# Patient Record
Sex: FEMALE | Race: WHITE | Employment: FULL TIME | URBAN - METROPOLITAN AREA
[De-identification: names, ages, dates, MRNs, and addresses within clinical notes are randomized per-mention and may not be internally consistent; named-entity substitution may affect disease eponyms.]

---

## 2017-07-27 ENCOUNTER — HOSPITAL ENCOUNTER (INPATIENT)
Age: 27
LOS: 2 days | Discharge: HOME | DRG: 563 | End: 2017-07-29
Attending: EMERGENCY MEDICINE | Admitting: ORTHOPAEDIC SURGERY

## 2017-07-27 ENCOUNTER — APPOINTMENT (OUTPATIENT)
Dept: CT IMAGING | Age: 27
DRG: 563 | End: 2017-07-27

## 2017-07-27 ENCOUNTER — APPOINTMENT (OUTPATIENT)
Dept: GENERAL RADIOLOGY | Age: 27
DRG: 563 | End: 2017-07-27

## 2017-07-27 ENCOUNTER — ANESTHESIA EVENT (OUTPATIENT)
Dept: OPERATING ROOM | Age: 27
End: 2017-07-27

## 2017-07-27 DIAGNOSIS — S82.142A TIBIAL PLATEAU FRACTURE, LEFT, CLOSED, INITIAL ENCOUNTER: Primary | ICD-10-CM

## 2017-07-27 LAB
ABSOLUTE EOS #: 0.2 K/UL (ref 0–0.4)
ABSOLUTE LYMPH #: 2.7 K/UL (ref 1–4.8)
ABSOLUTE MONO #: 0.6 K/UL (ref 0.2–0.8)
ANION GAP SERPL CALCULATED.3IONS-SCNC: 19 MMOL/L (ref 9–17)
BASOPHILS # BLD: 1 %
BASOPHILS ABSOLUTE: 0.1 K/UL (ref 0–0.2)
BUN BLDV-MCNC: 15 MG/DL (ref 6–20)
BUN/CREAT BLD: 24 (ref 9–20)
CALCIUM SERPL-MCNC: 9.3 MG/DL (ref 8.6–10.4)
CHLORIDE BLD-SCNC: 101 MMOL/L (ref 98–107)
CO2: 20 MMOL/L (ref 20–31)
CREAT SERPL-MCNC: 0.63 MG/DL (ref 0.5–0.9)
DIFFERENTIAL TYPE: NORMAL
EOSINOPHILS RELATIVE PERCENT: 2 %
ETHANOL PERCENT: 0.17 %
ETHANOL: 169 MG/DL
GFR AFRICAN AMERICAN: >60 ML/MIN
GFR NON-AFRICAN AMERICAN: >60 ML/MIN
GFR SERPL CREATININE-BSD FRML MDRD: ABNORMAL ML/MIN/{1.73_M2}
GFR SERPL CREATININE-BSD FRML MDRD: ABNORMAL ML/MIN/{1.73_M2}
GLUCOSE BLD-MCNC: 128 MG/DL (ref 70–99)
HCG QUALITATIVE: NEGATIVE
HCT VFR BLD CALC: 39.3 % (ref 36–46)
HEMOGLOBIN: 13.4 G/DL (ref 12–16)
INR BLD: 1
LYMPHOCYTES # BLD: 25 %
MCH RBC QN AUTO: 30.2 PG (ref 26–34)
MCHC RBC AUTO-ENTMCNC: 34.2 G/DL (ref 31–37)
MCV RBC AUTO: 88.3 FL (ref 80–100)
MONOCYTES # BLD: 5 %
PARTIAL THROMBOPLASTIN TIME: 22.6 SEC (ref 23–31)
PDW BLD-RTO: 12.8 % (ref 11.5–14.5)
PLATELET # BLD: 210 K/UL (ref 130–400)
PLATELET ESTIMATE: NORMAL
PMV BLD AUTO: 9.7 FL (ref 6–12)
POTASSIUM SERPL-SCNC: 3.7 MMOL/L (ref 3.7–5.3)
PROTHROMBIN TIME: 10 SEC (ref 9.7–11.6)
RBC # BLD: 4.46 M/UL (ref 4–5.2)
RBC # BLD: NORMAL 10*6/UL
SEG NEUTROPHILS: 67 %
SEGMENTED NEUTROPHILS ABSOLUTE COUNT: 7.3 K/UL (ref 1.8–7.7)
SODIUM BLD-SCNC: 140 MMOL/L (ref 135–144)
WBC # BLD: 10.9 K/UL (ref 3.5–11)
WBC # BLD: NORMAL 10*3/UL

## 2017-07-27 PROCEDURE — 80048 BASIC METABOLIC PNL TOTAL CA: CPT

## 2017-07-27 PROCEDURE — 2580000003 HC RX 258: Performed by: EMERGENCY MEDICINE

## 2017-07-27 PROCEDURE — 6360000002 HC RX W HCPCS: Performed by: ORTHOPAEDIC SURGERY

## 2017-07-27 PROCEDURE — G0480 DRUG TEST DEF 1-7 CLASSES: HCPCS

## 2017-07-27 PROCEDURE — 85025 COMPLETE CBC W/AUTO DIFF WBC: CPT

## 2017-07-27 PROCEDURE — 76376 3D RENDER W/INTRP POSTPROCES: CPT

## 2017-07-27 PROCEDURE — 2500000003 HC RX 250 WO HCPCS: Performed by: ORTHOPAEDIC SURGERY

## 2017-07-27 PROCEDURE — 73700 CT LOWER EXTREMITY W/O DYE: CPT

## 2017-07-27 PROCEDURE — 6360000002 HC RX W HCPCS: Performed by: EMERGENCY MEDICINE

## 2017-07-27 PROCEDURE — 85610 PROTHROMBIN TIME: CPT

## 2017-07-27 PROCEDURE — 85730 THROMBOPLASTIN TIME PARTIAL: CPT

## 2017-07-27 PROCEDURE — 1200000000 HC SEMI PRIVATE

## 2017-07-27 PROCEDURE — 99285 EMERGENCY DEPT VISIT HI MDM: CPT

## 2017-07-27 PROCEDURE — 96374 THER/PROPH/DIAG INJ IV PUSH: CPT

## 2017-07-27 PROCEDURE — 2580000003 HC RX 258: Performed by: ORTHOPAEDIC SURGERY

## 2017-07-27 PROCEDURE — 73562 X-RAY EXAM OF KNEE 3: CPT

## 2017-07-27 PROCEDURE — S0028 INJECTION, FAMOTIDINE, 20 MG: HCPCS | Performed by: ORTHOPAEDIC SURGERY

## 2017-07-27 PROCEDURE — 96375 TX/PRO/DX INJ NEW DRUG ADDON: CPT

## 2017-07-27 PROCEDURE — 84703 CHORIONIC GONADOTROPIN ASSAY: CPT

## 2017-07-27 RX ORDER — SODIUM CHLORIDE 9 MG/ML
INJECTION, SOLUTION INTRAVENOUS CONTINUOUS
Status: DISCONTINUED | OUTPATIENT
Start: 2017-07-27 | End: 2017-07-28

## 2017-07-27 RX ORDER — SODIUM CHLORIDE 0.9 % (FLUSH) 0.9 %
10 SYRINGE (ML) INJECTION EVERY 12 HOURS SCHEDULED
Status: CANCELLED | OUTPATIENT
Start: 2017-07-27

## 2017-07-27 RX ORDER — MORPHINE SULFATE 4 MG/ML
4 INJECTION, SOLUTION INTRAMUSCULAR; INTRAVENOUS
Status: DISCONTINUED | OUTPATIENT
Start: 2017-07-27 | End: 2017-07-29 | Stop reason: HOSPADM

## 2017-07-27 RX ORDER — LORAZEPAM 2 MG/ML
1 INJECTION INTRAMUSCULAR EVERY 4 HOURS PRN
Status: DISCONTINUED | OUTPATIENT
Start: 2017-07-27 | End: 2017-07-29 | Stop reason: HOSPADM

## 2017-07-27 RX ORDER — ONDANSETRON 2 MG/ML
4 INJECTION INTRAMUSCULAR; INTRAVENOUS EVERY 6 HOURS PRN
Status: DISCONTINUED | OUTPATIENT
Start: 2017-07-27 | End: 2017-07-29 | Stop reason: HOSPADM

## 2017-07-27 RX ORDER — MORPHINE SULFATE 2 MG/ML
2 INJECTION, SOLUTION INTRAMUSCULAR; INTRAVENOUS
Status: DISCONTINUED | OUTPATIENT
Start: 2017-07-27 | End: 2017-07-29 | Stop reason: HOSPADM

## 2017-07-27 RX ORDER — DIAZEPAM 2 MG/1
2 TABLET ORAL EVERY 6 HOURS PRN
Qty: 20 TABLET | Refills: 0 | Status: SHIPPED | OUTPATIENT
Start: 2017-07-27 | End: 2017-08-06

## 2017-07-27 RX ORDER — DOCUSATE SODIUM 100 MG/1
100 CAPSULE, LIQUID FILLED ORAL 2 TIMES DAILY
Qty: 30 CAPSULE | Refills: 0 | Status: SHIPPED | OUTPATIENT
Start: 2017-07-27

## 2017-07-27 RX ORDER — SODIUM CHLORIDE 9 MG/ML
INJECTION, SOLUTION INTRAVENOUS CONTINUOUS
Status: DISCONTINUED | OUTPATIENT
Start: 2017-07-27 | End: 2017-07-27 | Stop reason: DRUGHIGH

## 2017-07-27 RX ORDER — LORAZEPAM 2 MG/ML
1 INJECTION INTRAMUSCULAR ONCE
Status: COMPLETED | OUTPATIENT
Start: 2017-07-27 | End: 2017-07-27

## 2017-07-27 RX ORDER — SODIUM CHLORIDE 0.9 % (FLUSH) 0.9 %
10 SYRINGE (ML) INJECTION PRN
Status: CANCELLED | OUTPATIENT
Start: 2017-07-27

## 2017-07-27 RX ORDER — ONDANSETRON 2 MG/ML
4 INJECTION INTRAMUSCULAR; INTRAVENOUS ONCE
Status: COMPLETED | OUTPATIENT
Start: 2017-07-27 | End: 2017-07-27

## 2017-07-27 RX ORDER — SODIUM CHLORIDE 0.9 % (FLUSH) 0.9 %
10 SYRINGE (ML) INJECTION EVERY 12 HOURS SCHEDULED
Status: DISCONTINUED | OUTPATIENT
Start: 2017-07-27 | End: 2017-07-29 | Stop reason: HOSPADM

## 2017-07-27 RX ORDER — SODIUM CHLORIDE 0.9 % (FLUSH) 0.9 %
10 SYRINGE (ML) INJECTION PRN
Status: DISCONTINUED | OUTPATIENT
Start: 2017-07-27 | End: 2017-07-29 | Stop reason: HOSPADM

## 2017-07-27 RX ORDER — ACETAMINOPHEN 500 MG
1000 TABLET ORAL ONCE
Status: CANCELLED | OUTPATIENT
Start: 2017-07-27 | End: 2017-07-27

## 2017-07-27 RX ORDER — OXYCODONE HYDROCHLORIDE AND ACETAMINOPHEN 5; 325 MG/1; MG/1
1-2 TABLET ORAL EVERY 4 HOURS PRN
Qty: 80 TABLET | Refills: 0 | Status: SHIPPED | OUTPATIENT
Start: 2017-07-27 | End: 2017-08-03

## 2017-07-27 RX ORDER — ONDANSETRON 4 MG/1
4 TABLET, FILM COATED ORAL EVERY 6 HOURS PRN
Qty: 30 TABLET | Refills: 1 | Status: SHIPPED | OUTPATIENT
Start: 2017-07-27

## 2017-07-27 RX ADMIN — SODIUM CHLORIDE: 9 INJECTION, SOLUTION INTRAVENOUS at 20:34

## 2017-07-27 RX ADMIN — LORAZEPAM 1 MG: 2 INJECTION INTRAMUSCULAR; INTRAVENOUS at 04:26

## 2017-07-27 RX ADMIN — MORPHINE SULFATE 4 MG: 4 INJECTION, SOLUTION INTRAMUSCULAR; INTRAVENOUS at 17:52

## 2017-07-27 RX ADMIN — SODIUM CHLORIDE: 9 INJECTION, SOLUTION INTRAVENOUS at 04:26

## 2017-07-27 RX ADMIN — MORPHINE SULFATE 4 MG: 4 INJECTION, SOLUTION INTRAMUSCULAR; INTRAVENOUS at 10:51

## 2017-07-27 RX ADMIN — MORPHINE SULFATE 4 MG: 4 INJECTION, SOLUTION INTRAMUSCULAR; INTRAVENOUS at 07:30

## 2017-07-27 RX ADMIN — MORPHINE SULFATE 4 MG: 4 INJECTION, SOLUTION INTRAMUSCULAR; INTRAVENOUS at 14:58

## 2017-07-27 RX ADMIN — LORAZEPAM 1 MG: 2 INJECTION INTRAMUSCULAR; INTRAVENOUS at 12:40

## 2017-07-27 RX ADMIN — FAMOTIDINE 20 MG: 10 INJECTION, SOLUTION INTRAVENOUS at 20:34

## 2017-07-27 RX ADMIN — SODIUM CHLORIDE: 9 INJECTION, SOLUTION INTRAVENOUS at 06:13

## 2017-07-27 RX ADMIN — ONDANSETRON 4 MG: 2 INJECTION INTRAMUSCULAR; INTRAVENOUS at 04:26

## 2017-07-27 RX ADMIN — MORPHINE SULFATE 2 MG: 2 INJECTION, SOLUTION INTRAMUSCULAR; INTRAVENOUS at 22:42

## 2017-07-27 RX ADMIN — FAMOTIDINE 20 MG: 10 INJECTION, SOLUTION INTRAVENOUS at 07:46

## 2017-07-27 RX ADMIN — SODIUM CHLORIDE: 9 INJECTION, SOLUTION INTRAVENOUS at 12:51

## 2017-07-27 ASSESSMENT — PAIN DESCRIPTION - ORIENTATION
ORIENTATION: LEFT

## 2017-07-27 ASSESSMENT — PAIN SCALES - GENERAL
PAINLEVEL_OUTOF10: 4
PAINLEVEL_OUTOF10: 9
PAINLEVEL_OUTOF10: 7
PAINLEVEL_OUTOF10: 7
PAINLEVEL_OUTOF10: 6
PAINLEVEL_OUTOF10: 7
PAINLEVEL_OUTOF10: 0
PAINLEVEL_OUTOF10: 4
PAINLEVEL_OUTOF10: 3
PAINLEVEL_OUTOF10: 4
PAINLEVEL_OUTOF10: 0
PAINLEVEL_OUTOF10: 7

## 2017-07-27 ASSESSMENT — PAIN DESCRIPTION - DESCRIPTORS
DESCRIPTORS: ACHING
DESCRIPTORS: SPASM
DESCRIPTORS: ACHING;SHARP

## 2017-07-27 ASSESSMENT — PAIN DESCRIPTION - PAIN TYPE
TYPE: ACUTE PAIN

## 2017-07-27 ASSESSMENT — PAIN DESCRIPTION - FREQUENCY
FREQUENCY: CONTINUOUS

## 2017-07-27 ASSESSMENT — PAIN DESCRIPTION - ONSET
ONSET: ON-GOING
ONSET: ON-GOING

## 2017-07-27 ASSESSMENT — PAIN DESCRIPTION - LOCATION
LOCATION: KNEE
LOCATION: OTHER (COMMENT)
LOCATION: KNEE
LOCATION: LEG

## 2017-07-28 ENCOUNTER — ANESTHESIA (OUTPATIENT)
Dept: OPERATING ROOM | Age: 27
End: 2017-07-28

## 2017-07-28 PROCEDURE — 1200000000 HC SEMI PRIVATE

## 2017-07-28 PROCEDURE — 97166 OT EVAL MOD COMPLEX 45 MIN: CPT

## 2017-07-28 PROCEDURE — 2580000003 HC RX 258: Performed by: ORTHOPAEDIC SURGERY

## 2017-07-28 PROCEDURE — 6360000002 HC RX W HCPCS: Performed by: ORTHOPAEDIC SURGERY

## 2017-07-28 PROCEDURE — 97116 GAIT TRAINING THERAPY: CPT

## 2017-07-28 PROCEDURE — 6370000000 HC RX 637 (ALT 250 FOR IP): Performed by: ORTHOPAEDIC SURGERY

## 2017-07-28 PROCEDURE — G8987 SELF CARE CURRENT STATUS: HCPCS

## 2017-07-28 PROCEDURE — 97535 SELF CARE MNGMENT TRAINING: CPT

## 2017-07-28 PROCEDURE — 97161 PT EVAL LOW COMPLEX 20 MIN: CPT

## 2017-07-28 PROCEDURE — 97530 THERAPEUTIC ACTIVITIES: CPT

## 2017-07-28 PROCEDURE — G8988 SELF CARE GOAL STATUS: HCPCS

## 2017-07-28 RX ORDER — DOCUSATE SODIUM 100 MG/1
100 CAPSULE, LIQUID FILLED ORAL 2 TIMES DAILY
Status: DISCONTINUED | OUTPATIENT
Start: 2017-07-28 | End: 2017-07-29 | Stop reason: HOSPADM

## 2017-07-28 RX ORDER — OXYCODONE HYDROCHLORIDE AND ACETAMINOPHEN 5; 325 MG/1; MG/1
1 TABLET ORAL EVERY 4 HOURS PRN
Status: DISCONTINUED | OUTPATIENT
Start: 2017-07-28 | End: 2017-07-29 | Stop reason: HOSPADM

## 2017-07-28 RX ORDER — OXYCODONE HYDROCHLORIDE AND ACETAMINOPHEN 5; 325 MG/1; MG/1
2 TABLET ORAL EVERY 4 HOURS PRN
Status: DISCONTINUED | OUTPATIENT
Start: 2017-07-28 | End: 2017-07-29 | Stop reason: HOSPADM

## 2017-07-28 RX ADMIN — MORPHINE SULFATE 2 MG: 2 INJECTION, SOLUTION INTRAMUSCULAR; INTRAVENOUS at 03:24

## 2017-07-28 RX ADMIN — SODIUM CHLORIDE: 9 INJECTION, SOLUTION INTRAVENOUS at 04:41

## 2017-07-28 RX ADMIN — DOCUSATE SODIUM 100 MG: 100 CAPSULE, LIQUID FILLED ORAL at 21:40

## 2017-07-28 RX ADMIN — OXYCODONE HYDROCHLORIDE AND ACETAMINOPHEN 2 TABLET: 5; 325 TABLET ORAL at 21:40

## 2017-07-28 RX ADMIN — MORPHINE SULFATE 2 MG: 2 INJECTION, SOLUTION INTRAMUSCULAR; INTRAVENOUS at 01:10

## 2017-07-28 RX ADMIN — MORPHINE SULFATE 2 MG: 2 INJECTION, SOLUTION INTRAMUSCULAR; INTRAVENOUS at 08:49

## 2017-07-28 RX ADMIN — OXYCODONE HYDROCHLORIDE AND ACETAMINOPHEN 2 TABLET: 5; 325 TABLET ORAL at 13:38

## 2017-07-28 RX ADMIN — Medication 10 ML: at 21:40

## 2017-07-28 RX ADMIN — OXYCODONE HYDROCHLORIDE AND ACETAMINOPHEN 1 TABLET: 5; 325 TABLET ORAL at 10:07

## 2017-07-28 RX ADMIN — OXYCODONE HYDROCHLORIDE AND ACETAMINOPHEN 2 TABLET: 5; 325 TABLET ORAL at 17:38

## 2017-07-28 ASSESSMENT — PAIN DESCRIPTION - PAIN TYPE
TYPE: ACUTE PAIN

## 2017-07-28 ASSESSMENT — PAIN DESCRIPTION - ORIENTATION
ORIENTATION: LEFT

## 2017-07-28 ASSESSMENT — PAIN SCALES - GENERAL
PAINLEVEL_OUTOF10: 7
PAINLEVEL_OUTOF10: 2
PAINLEVEL_OUTOF10: 3
PAINLEVEL_OUTOF10: 5
PAINLEVEL_OUTOF10: 4
PAINLEVEL_OUTOF10: 3
PAINLEVEL_OUTOF10: 7
PAINLEVEL_OUTOF10: 2
PAINLEVEL_OUTOF10: 0
PAINLEVEL_OUTOF10: 3
PAINLEVEL_OUTOF10: 3
PAINLEVEL_OUTOF10: 4
PAINLEVEL_OUTOF10: 5
PAINLEVEL_OUTOF10: 7
PAINLEVEL_OUTOF10: 2

## 2017-07-28 ASSESSMENT — PAIN DESCRIPTION - LOCATION
LOCATION: LEG
LOCATION: KNEE

## 2017-07-28 ASSESSMENT — PAIN DESCRIPTION - DESCRIPTORS
DESCRIPTORS: ACHING

## 2017-07-29 VITALS
DIASTOLIC BLOOD PRESSURE: 80 MMHG | HEIGHT: 66 IN | TEMPERATURE: 97.4 F | HEART RATE: 99 BPM | RESPIRATION RATE: 16 BRPM | BODY MASS INDEX: 35.36 KG/M2 | WEIGHT: 220 LBS | SYSTOLIC BLOOD PRESSURE: 135 MMHG | OXYGEN SATURATION: 95 %

## 2017-07-29 PROCEDURE — 97530 THERAPEUTIC ACTIVITIES: CPT

## 2017-07-29 PROCEDURE — 97535 SELF CARE MNGMENT TRAINING: CPT

## 2017-07-29 PROCEDURE — 6370000000 HC RX 637 (ALT 250 FOR IP): Performed by: ORTHOPAEDIC SURGERY

## 2017-07-29 PROCEDURE — 2580000003 HC RX 258: Performed by: ORTHOPAEDIC SURGERY

## 2017-07-29 PROCEDURE — 97116 GAIT TRAINING THERAPY: CPT

## 2017-07-29 RX ADMIN — OXYCODONE HYDROCHLORIDE AND ACETAMINOPHEN 2 TABLET: 5; 325 TABLET ORAL at 08:37

## 2017-07-29 RX ADMIN — Medication 10 ML: at 08:39

## 2017-07-29 RX ADMIN — DOCUSATE SODIUM 100 MG: 100 CAPSULE, LIQUID FILLED ORAL at 08:38

## 2017-07-29 RX ADMIN — OXYCODONE HYDROCHLORIDE AND ACETAMINOPHEN 2 TABLET: 5; 325 TABLET ORAL at 03:03

## 2017-07-29 ASSESSMENT — PAIN DESCRIPTION - LOCATION
LOCATION: LEG

## 2017-07-29 ASSESSMENT — PAIN SCALES - GENERAL
PAINLEVEL_OUTOF10: 3
PAINLEVEL_OUTOF10: 3
PAINLEVEL_OUTOF10: 1
PAINLEVEL_OUTOF10: 7

## 2017-07-29 ASSESSMENT — PAIN DESCRIPTION - DESCRIPTORS
DESCRIPTORS: ACHING

## 2017-07-29 ASSESSMENT — PAIN DESCRIPTION - ORIENTATION
ORIENTATION: LEFT

## 2017-07-29 ASSESSMENT — PAIN DESCRIPTION - FREQUENCY: FREQUENCY: CONTINUOUS

## 2017-07-29 ASSESSMENT — PAIN DESCRIPTION - PAIN TYPE
TYPE: ACUTE PAIN

## 2017-08-04 ENCOUNTER — HOSPITAL ENCOUNTER (OUTPATIENT)
Age: 27
Setting detail: OBSERVATION
Discharge: HOME | End: 2017-08-06
Attending: ORTHOPAEDIC SURGERY | Admitting: ORTHOPAEDIC SURGERY

## 2017-08-04 ENCOUNTER — APPOINTMENT (OUTPATIENT)
Dept: GENERAL RADIOLOGY | Age: 27
End: 2017-08-04
Attending: ORTHOPAEDIC SURGERY

## 2017-08-04 VITALS — OXYGEN SATURATION: 98 % | SYSTOLIC BLOOD PRESSURE: 147 MMHG | TEMPERATURE: 98.2 F | DIASTOLIC BLOOD PRESSURE: 90 MMHG

## 2017-08-04 LAB — HCG, PREGNANCY URINE (POC): NEGATIVE

## 2017-08-04 PROCEDURE — 2500000003 HC RX 250 WO HCPCS: Performed by: STUDENT IN AN ORGANIZED HEALTH CARE EDUCATION/TRAINING PROGRAM

## 2017-08-04 PROCEDURE — 96374 THER/PROPH/DIAG INJ IV PUSH: CPT

## 2017-08-04 PROCEDURE — 73562 X-RAY EXAM OF KNEE 3: CPT

## 2017-08-04 PROCEDURE — 6360000002 HC RX W HCPCS: Performed by: NURSE ANESTHETIST, CERTIFIED REGISTERED

## 2017-08-04 PROCEDURE — 96376 TX/PRO/DX INJ SAME DRUG ADON: CPT

## 2017-08-04 PROCEDURE — 6360000002 HC RX W HCPCS: Performed by: STUDENT IN AN ORGANIZED HEALTH CARE EDUCATION/TRAINING PROGRAM

## 2017-08-04 PROCEDURE — 73590 X-RAY EXAM OF LOWER LEG: CPT

## 2017-08-04 PROCEDURE — 6360000002 HC RX W HCPCS

## 2017-08-04 PROCEDURE — 2580000003 HC RX 258: Performed by: ANESTHESIOLOGY

## 2017-08-04 PROCEDURE — 2580000003 HC RX 258: Performed by: STUDENT IN AN ORGANIZED HEALTH CARE EDUCATION/TRAINING PROGRAM

## 2017-08-04 PROCEDURE — 84703 CHORIONIC GONADOTROPIN ASSAY: CPT

## 2017-08-04 PROCEDURE — 2720000010 HC SURG SUPPLY STERILE: Performed by: ORTHOPAEDIC SURGERY

## 2017-08-04 PROCEDURE — 3600000014 HC SURGERY LEVEL 4 ADDTL 15MIN: Performed by: ORTHOPAEDIC SURGERY

## 2017-08-04 PROCEDURE — 2500000003 HC RX 250 WO HCPCS: Performed by: NURSE ANESTHETIST, CERTIFIED REGISTERED

## 2017-08-04 PROCEDURE — 2500000003 HC RX 250 WO HCPCS: Performed by: ORTHOPAEDIC SURGERY

## 2017-08-04 PROCEDURE — 3700000000 HC ANESTHESIA ATTENDED CARE: Performed by: ORTHOPAEDIC SURGERY

## 2017-08-04 PROCEDURE — 3700000001 HC ADD 15 MINUTES (ANESTHESIA): Performed by: ORTHOPAEDIC SURGERY

## 2017-08-04 PROCEDURE — 2500000003 HC RX 250 WO HCPCS: Performed by: ANESTHESIOLOGY

## 2017-08-04 PROCEDURE — 6370000000 HC RX 637 (ALT 250 FOR IP): Performed by: STUDENT IN AN ORGANIZED HEALTH CARE EDUCATION/TRAINING PROGRAM

## 2017-08-04 PROCEDURE — 7100000000 HC PACU RECOVERY - FIRST 15 MIN: Performed by: ORTHOPAEDIC SURGERY

## 2017-08-04 PROCEDURE — G0378 HOSPITAL OBSERVATION PER HR: HCPCS

## 2017-08-04 PROCEDURE — 7100000001 HC PACU RECOVERY - ADDTL 15 MIN: Performed by: ORTHOPAEDIC SURGERY

## 2017-08-04 PROCEDURE — A6454 SELF-ADHER BAND W>=3" <5"/YD: HCPCS | Performed by: ORTHOPAEDIC SURGERY

## 2017-08-04 PROCEDURE — 6360000002 HC RX W HCPCS: Performed by: ANESTHESIOLOGY

## 2017-08-04 PROCEDURE — C1769 GUIDE WIRE: HCPCS | Performed by: ORTHOPAEDIC SURGERY

## 2017-08-04 PROCEDURE — C1713 ANCHOR/SCREW BN/BN,TIS/BN: HCPCS | Performed by: ORTHOPAEDIC SURGERY

## 2017-08-04 PROCEDURE — 2580000003 HC RX 258: Performed by: ORTHOPAEDIC SURGERY

## 2017-08-04 PROCEDURE — 3600000004 HC SURGERY LEVEL 4 BASE: Performed by: ORTHOPAEDIC SURGERY

## 2017-08-04 PROCEDURE — 73560 X-RAY EXAM OF KNEE 1 OR 2: CPT

## 2017-08-04 DEVICE — SCREW BNE L28MM DIA3.5MM CORT S STL ST NONCANNULATED LOK: Type: IMPLANTABLE DEVICE | Site: LEG | Status: FUNCTIONAL

## 2017-08-04 DEVICE — SCREW BNE L30MM DIA3.5MM CORT S STL ST NONCANNULATED LOK: Type: IMPLANTABLE DEVICE | Site: LEG | Status: FUNCTIONAL

## 2017-08-04 DEVICE — SCREW BNE L60MM DIA3.5MM PROX TIB S STL ST FULL THRD T15: Type: IMPLANTABLE DEVICE | Site: LEG | Status: FUNCTIONAL

## 2017-08-04 DEVICE — SCREW BNE L46MM DIA4MM S STL CANN SHT 1/3 THRD SM HEX SOCK: Type: IMPLANTABLE DEVICE | Site: LEG | Status: FUNCTIONAL

## 2017-08-04 DEVICE — SCREW BNE L75MM DIA3.5MM PROX TIB S STL ST FULL THRD T15: Type: IMPLANTABLE DEVICE | Site: LEG | Status: FUNCTIONAL

## 2017-08-04 DEVICE — SCREW BNE L70MM DIA3.5MM PROX TIB S STL ST FULL THRD T15: Type: IMPLANTABLE DEVICE | Site: LEG | Status: FUNCTIONAL

## 2017-08-04 DEVICE — PLATE BNE L117MM 6 H NONSTERILE L PROX TIB S STL VAR ANG: Type: IMPLANTABLE DEVICE | Site: LEG | Status: FUNCTIONAL

## 2017-08-04 DEVICE — SCREW BNE L42MM DIA4MM S STL CANN SHT 1/3 THRD SM HEX SOCK: Type: IMPLANTABLE DEVICE | Site: LEG | Status: FUNCTIONAL

## 2017-08-04 RX ORDER — MAGNESIUM HYDROXIDE 1200 MG/15ML
LIQUID ORAL CONTINUOUS PRN
Status: DISCONTINUED | OUTPATIENT
Start: 2017-08-04 | End: 2017-08-04 | Stop reason: HOSPADM

## 2017-08-04 RX ORDER — OXYCODONE HYDROCHLORIDE AND ACETAMINOPHEN 5; 325 MG/1; MG/1
1 TABLET ORAL EVERY 4 HOURS PRN
Status: DISCONTINUED | OUTPATIENT
Start: 2017-08-04 | End: 2017-08-06 | Stop reason: HOSPADM

## 2017-08-04 RX ORDER — MIDAZOLAM HYDROCHLORIDE 1 MG/ML
INJECTION INTRAMUSCULAR; INTRAVENOUS PRN
Status: DISCONTINUED | OUTPATIENT
Start: 2017-08-04 | End: 2017-08-04 | Stop reason: SDUPTHER

## 2017-08-04 RX ORDER — MEPERIDINE HYDROCHLORIDE 50 MG/ML
12.5 INJECTION INTRAMUSCULAR; INTRAVENOUS; SUBCUTANEOUS EVERY 5 MIN PRN
Status: DISCONTINUED | OUTPATIENT
Start: 2017-08-04 | End: 2017-08-04 | Stop reason: HOSPADM

## 2017-08-04 RX ORDER — LABETALOL HYDROCHLORIDE 5 MG/ML
10 INJECTION, SOLUTION INTRAVENOUS
Status: COMPLETED | OUTPATIENT
Start: 2017-08-04 | End: 2017-08-04

## 2017-08-04 RX ORDER — OXYCODONE HYDROCHLORIDE AND ACETAMINOPHEN 5; 325 MG/1; MG/1
2 TABLET ORAL EVERY 4 HOURS PRN
Status: DISCONTINUED | OUTPATIENT
Start: 2017-08-04 | End: 2017-08-06 | Stop reason: HOSPADM

## 2017-08-04 RX ORDER — FENTANYL CITRATE 50 UG/ML
50 INJECTION, SOLUTION INTRAMUSCULAR; INTRAVENOUS EVERY 5 MIN PRN
Status: DISCONTINUED | OUTPATIENT
Start: 2017-08-04 | End: 2017-08-04 | Stop reason: HOSPADM

## 2017-08-04 RX ORDER — BUPIVACAINE HYDROCHLORIDE 5 MG/ML
INJECTION, SOLUTION EPIDURAL; INTRACAUDAL PRN
Status: DISCONTINUED | OUTPATIENT
Start: 2017-08-04 | End: 2017-08-04 | Stop reason: HOSPADM

## 2017-08-04 RX ORDER — SODIUM CHLORIDE 0.9 % (FLUSH) 0.9 %
10 SYRINGE (ML) INJECTION EVERY 12 HOURS SCHEDULED
Status: DISCONTINUED | OUTPATIENT
Start: 2017-08-04 | End: 2017-08-04 | Stop reason: HOSPADM

## 2017-08-04 RX ORDER — LIDOCAINE HYDROCHLORIDE 10 MG/ML
1 INJECTION, SOLUTION EPIDURAL; INFILTRATION; INTRACAUDAL; PERINEURAL
Status: DISCONTINUED | OUTPATIENT
Start: 2017-08-04 | End: 2017-08-04 | Stop reason: HOSPADM

## 2017-08-04 RX ORDER — MORPHINE SULFATE 4 MG/ML
4 INJECTION, SOLUTION INTRAMUSCULAR; INTRAVENOUS
Status: DISCONTINUED | OUTPATIENT
Start: 2017-08-04 | End: 2017-08-06 | Stop reason: HOSPADM

## 2017-08-04 RX ORDER — DEXAMETHASONE SODIUM PHOSPHATE 10 MG/ML
INJECTION, SOLUTION INTRAMUSCULAR; INTRAVENOUS PRN
Status: DISCONTINUED | OUTPATIENT
Start: 2017-08-04 | End: 2017-08-04 | Stop reason: SDUPTHER

## 2017-08-04 RX ORDER — ROCURONIUM BROMIDE 10 MG/ML
INJECTION, SOLUTION INTRAVENOUS PRN
Status: DISCONTINUED | OUTPATIENT
Start: 2017-08-04 | End: 2017-08-04 | Stop reason: SDUPTHER

## 2017-08-04 RX ORDER — FENTANYL CITRATE 50 UG/ML
25 INJECTION, SOLUTION INTRAMUSCULAR; INTRAVENOUS EVERY 5 MIN PRN
Status: DISCONTINUED | OUTPATIENT
Start: 2017-08-04 | End: 2017-08-04 | Stop reason: HOSPADM

## 2017-08-04 RX ORDER — ONDANSETRON 2 MG/ML
INJECTION INTRAMUSCULAR; INTRAVENOUS PRN
Status: DISCONTINUED | OUTPATIENT
Start: 2017-08-04 | End: 2017-08-04 | Stop reason: SDUPTHER

## 2017-08-04 RX ORDER — MIDAZOLAM HYDROCHLORIDE 1 MG/ML
1 INJECTION INTRAMUSCULAR; INTRAVENOUS
Status: COMPLETED | OUTPATIENT
Start: 2017-08-04 | End: 2017-08-04

## 2017-08-04 RX ORDER — ONDANSETRON 2 MG/ML
4 INJECTION INTRAMUSCULAR; INTRAVENOUS EVERY 6 HOURS PRN
Status: DISCONTINUED | OUTPATIENT
Start: 2017-08-04 | End: 2017-08-06 | Stop reason: HOSPADM

## 2017-08-04 RX ORDER — SODIUM CHLORIDE 0.9 % (FLUSH) 0.9 %
10 SYRINGE (ML) INJECTION PRN
Status: DISCONTINUED | OUTPATIENT
Start: 2017-08-04 | End: 2017-08-04 | Stop reason: HOSPADM

## 2017-08-04 RX ORDER — ONDANSETRON 2 MG/ML
4 INJECTION INTRAMUSCULAR; INTRAVENOUS
Status: DISCONTINUED | OUTPATIENT
Start: 2017-08-04 | End: 2017-08-04 | Stop reason: HOSPADM

## 2017-08-04 RX ORDER — LIDOCAINE HYDROCHLORIDE 10 MG/ML
INJECTION, SOLUTION EPIDURAL; INFILTRATION; INTRACAUDAL; PERINEURAL PRN
Status: DISCONTINUED | OUTPATIENT
Start: 2017-08-04 | End: 2017-08-04 | Stop reason: SDUPTHER

## 2017-08-04 RX ORDER — DIAZEPAM 2 MG/1
2 TABLET ORAL EVERY 6 HOURS PRN
Status: DISCONTINUED | OUTPATIENT
Start: 2017-08-04 | End: 2017-08-06 | Stop reason: HOSPADM

## 2017-08-04 RX ORDER — NORGESTIMATE AND ETHINYL ESTRADIOL 0.25-0.035
1 KIT ORAL
COMMUNITY

## 2017-08-04 RX ORDER — SODIUM CHLORIDE, SODIUM LACTATE, POTASSIUM CHLORIDE, CALCIUM CHLORIDE 600; 310; 30; 20 MG/100ML; MG/100ML; MG/100ML; MG/100ML
INJECTION, SOLUTION INTRAVENOUS CONTINUOUS
Status: DISCONTINUED | OUTPATIENT
Start: 2017-08-04 | End: 2017-08-04

## 2017-08-04 RX ORDER — SODIUM CHLORIDE 450 MG/100ML
INJECTION, SOLUTION INTRAVENOUS CONTINUOUS
Status: DISCONTINUED | OUTPATIENT
Start: 2017-08-04 | End: 2017-08-06 | Stop reason: HOSPADM

## 2017-08-04 RX ORDER — MORPHINE SULFATE 2 MG/ML
2 INJECTION, SOLUTION INTRAMUSCULAR; INTRAVENOUS
Status: DISCONTINUED | OUTPATIENT
Start: 2017-08-04 | End: 2017-08-06 | Stop reason: HOSPADM

## 2017-08-04 RX ORDER — SODIUM CHLORIDE 0.9 % (FLUSH) 0.9 %
10 SYRINGE (ML) INJECTION PRN
Status: DISCONTINUED | OUTPATIENT
Start: 2017-08-04 | End: 2017-08-06 | Stop reason: HOSPADM

## 2017-08-04 RX ORDER — OXYCODONE HYDROCHLORIDE AND ACETAMINOPHEN 5; 325 MG/1; MG/1
1 TABLET ORAL EVERY 4 HOURS PRN
Qty: 50 TABLET | Refills: 0 | Status: SHIPPED | OUTPATIENT
Start: 2017-08-04 | End: 2017-08-17 | Stop reason: SDUPTHER

## 2017-08-04 RX ORDER — SODIUM CHLORIDE 0.9 % (FLUSH) 0.9 %
10 SYRINGE (ML) INJECTION EVERY 12 HOURS SCHEDULED
Status: DISCONTINUED | OUTPATIENT
Start: 2017-08-04 | End: 2017-08-06 | Stop reason: HOSPADM

## 2017-08-04 RX ORDER — ACETAMINOPHEN 500 MG
1000 TABLET ORAL ONCE
Status: DISCONTINUED | OUTPATIENT
Start: 2017-08-04 | End: 2017-08-04 | Stop reason: HOSPADM

## 2017-08-04 RX ORDER — OXYCODONE HYDROCHLORIDE AND ACETAMINOPHEN 5; 325 MG/1; MG/1
1 TABLET ORAL EVERY 4 HOURS PRN
Status: ON HOLD | COMMUNITY
End: 2017-08-04

## 2017-08-04 RX ORDER — PROPOFOL 10 MG/ML
INJECTION, EMULSION INTRAVENOUS PRN
Status: DISCONTINUED | OUTPATIENT
Start: 2017-08-04 | End: 2017-08-04 | Stop reason: SDUPTHER

## 2017-08-04 RX ORDER — DOCUSATE SODIUM 100 MG/1
100 CAPSULE, LIQUID FILLED ORAL 2 TIMES DAILY
Status: DISCONTINUED | OUTPATIENT
Start: 2017-08-04 | End: 2017-08-06 | Stop reason: HOSPADM

## 2017-08-04 RX ORDER — MIDAZOLAM HYDROCHLORIDE 1 MG/ML
INJECTION INTRAMUSCULAR; INTRAVENOUS
Status: COMPLETED
Start: 2017-08-04 | End: 2017-08-04

## 2017-08-04 RX ORDER — FENTANYL CITRATE 50 UG/ML
INJECTION, SOLUTION INTRAMUSCULAR; INTRAVENOUS PRN
Status: DISCONTINUED | OUTPATIENT
Start: 2017-08-04 | End: 2017-08-04 | Stop reason: SDUPTHER

## 2017-08-04 RX ORDER — ACETAMINOPHEN 325 MG/1
650 TABLET ORAL EVERY 4 HOURS PRN
Status: DISCONTINUED | OUTPATIENT
Start: 2017-08-04 | End: 2017-08-06 | Stop reason: HOSPADM

## 2017-08-04 RX ADMIN — DOCUSATE SODIUM 100 MG: 100 CAPSULE ORAL at 21:56

## 2017-08-04 RX ADMIN — DEXTROSE MONOHYDRATE 900 MG: 50 INJECTION, SOLUTION INTRAVENOUS at 17:39

## 2017-08-04 RX ADMIN — OXYCODONE HYDROCHLORIDE AND ACETAMINOPHEN 2 TABLET: 5; 325 TABLET ORAL at 22:58

## 2017-08-04 RX ADMIN — PROPOFOL 160 MG: 10 INJECTION, EMULSION INTRAVENOUS at 09:37

## 2017-08-04 RX ADMIN — HYDROMORPHONE HYDROCHLORIDE 1 MG: 1 INJECTION, SOLUTION INTRAMUSCULAR; INTRAVENOUS; SUBCUTANEOUS at 12:41

## 2017-08-04 RX ADMIN — MIDAZOLAM HYDROCHLORIDE 2 MG: 1 INJECTION, SOLUTION INTRAMUSCULAR; INTRAVENOUS at 09:32

## 2017-08-04 RX ADMIN — FENTANYL CITRATE 50 MCG: 50 INJECTION, SOLUTION INTRAMUSCULAR; INTRAVENOUS at 11:25

## 2017-08-04 RX ADMIN — FENTANYL CITRATE 200 MCG: 50 INJECTION, SOLUTION INTRAMUSCULAR; INTRAVENOUS at 09:52

## 2017-08-04 RX ADMIN — FENTANYL CITRATE 50 MCG: 50 INJECTION, SOLUTION INTRAMUSCULAR; INTRAVENOUS at 13:30

## 2017-08-04 RX ADMIN — SODIUM CHLORIDE, POTASSIUM CHLORIDE, SODIUM LACTATE AND CALCIUM CHLORIDE: 600; 310; 30; 20 INJECTION, SOLUTION INTRAVENOUS at 09:10

## 2017-08-04 RX ADMIN — ONDANSETRON 4 MG: 2 INJECTION, SOLUTION INTRAMUSCULAR; INTRAVENOUS at 09:56

## 2017-08-04 RX ADMIN — DEXAMETHASONE SODIUM PHOSPHATE 10 MG: 10 INJECTION, SOLUTION INTRAMUSCULAR; INTRAVENOUS at 09:56

## 2017-08-04 RX ADMIN — MORPHINE SULFATE 4 MG: 4 INJECTION, SOLUTION INTRAMUSCULAR; INTRAVENOUS at 20:24

## 2017-08-04 RX ADMIN — ROCURONIUM BROMIDE 50 MG: 10 INJECTION INTRAVENOUS at 09:37

## 2017-08-04 RX ADMIN — OXYCODONE HYDROCHLORIDE AND ACETAMINOPHEN 2 TABLET: 5; 325 TABLET ORAL at 15:20

## 2017-08-04 RX ADMIN — FENTANYL CITRATE 50 MCG: 50 INJECTION, SOLUTION INTRAMUSCULAR; INTRAVENOUS at 13:15

## 2017-08-04 RX ADMIN — SODIUM CHLORIDE, POTASSIUM CHLORIDE, SODIUM LACTATE AND CALCIUM CHLORIDE: 600; 310; 30; 20 INJECTION, SOLUTION INTRAVENOUS at 11:19

## 2017-08-04 RX ADMIN — Medication 900 MG: at 09:46

## 2017-08-04 RX ADMIN — FENTANYL CITRATE 150 MCG: 50 INJECTION, SOLUTION INTRAMUSCULAR; INTRAVENOUS at 09:46

## 2017-08-04 RX ADMIN — HYDROMORPHONE HYDROCHLORIDE 1 MG: 1 INJECTION, SOLUTION INTRAMUSCULAR; INTRAVENOUS; SUBCUTANEOUS at 12:12

## 2017-08-04 RX ADMIN — MIDAZOLAM HYDROCHLORIDE 1 MG: 1 INJECTION INTRAMUSCULAR; INTRAVENOUS at 13:45

## 2017-08-04 RX ADMIN — OXYCODONE HYDROCHLORIDE AND ACETAMINOPHEN 2 TABLET: 5; 325 TABLET ORAL at 19:23

## 2017-08-04 RX ADMIN — SODIUM CHLORIDE: 4.5 INJECTION, SOLUTION INTRAVENOUS at 17:39

## 2017-08-04 RX ADMIN — MIDAZOLAM HYDROCHLORIDE 1 MG: 1 INJECTION, SOLUTION INTRAMUSCULAR; INTRAVENOUS at 13:45

## 2017-08-04 RX ADMIN — LIDOCAINE HYDROCHLORIDE 50 MG: 10 INJECTION, SOLUTION EPIDURAL; INFILTRATION; INTRACAUDAL; PERINEURAL at 09:37

## 2017-08-04 RX ADMIN — LABETALOL HYDROCHLORIDE 10 MG: 5 INJECTION, SOLUTION INTRAVENOUS at 13:45

## 2017-08-04 RX ADMIN — FENTANYL CITRATE 50 MCG: 50 INJECTION, SOLUTION INTRAMUSCULAR; INTRAVENOUS at 12:03

## 2017-08-04 RX ADMIN — FENTANYL CITRATE 50 MCG: 50 INJECTION, SOLUTION INTRAMUSCULAR; INTRAVENOUS at 09:37

## 2017-08-04 RX ADMIN — FENTANYL CITRATE 50 MCG: 50 INJECTION, SOLUTION INTRAMUSCULAR; INTRAVENOUS at 14:39

## 2017-08-04 RX ADMIN — MORPHINE SULFATE 4 MG: 4 INJECTION, SOLUTION INTRAMUSCULAR; INTRAVENOUS at 17:39

## 2017-08-04 ASSESSMENT — PAIN SCALES - GENERAL
PAINLEVEL_OUTOF10: 0
PAINLEVEL_OUTOF10: 7
PAINLEVEL_OUTOF10: 7
PAINLEVEL_OUTOF10: 9
PAINLEVEL_OUTOF10: 6
PAINLEVEL_OUTOF10: 7
PAINLEVEL_OUTOF10: 3
PAINLEVEL_OUTOF10: 5
PAINLEVEL_OUTOF10: 3
PAINLEVEL_OUTOF10: 7
PAINLEVEL_OUTOF10: 4
PAINLEVEL_OUTOF10: 7
PAINLEVEL_OUTOF10: 7
PAINLEVEL_OUTOF10: 0
PAINLEVEL_OUTOF10: 10
PAINLEVEL_OUTOF10: 6

## 2017-08-04 ASSESSMENT — PAIN DESCRIPTION - PAIN TYPE
TYPE: SURGICAL PAIN
TYPE: ACUTE PAIN;SURGICAL PAIN
TYPE: SURGICAL PAIN
TYPE: ACUTE PAIN;SURGICAL PAIN

## 2017-08-04 ASSESSMENT — PAIN DESCRIPTION - ORIENTATION
ORIENTATION: LEFT
ORIENTATION: LEFT

## 2017-08-04 ASSESSMENT — PAIN DESCRIPTION - FREQUENCY
FREQUENCY: CONTINUOUS
FREQUENCY: CONTINUOUS

## 2017-08-04 ASSESSMENT — PAIN DESCRIPTION - ONSET
ONSET: ON-GOING
ONSET: ON-GOING

## 2017-08-04 ASSESSMENT — PAIN DESCRIPTION - LOCATION
LOCATION: LEG
LOCATION: LEG

## 2017-08-04 ASSESSMENT — PAIN DESCRIPTION - PROGRESSION
CLINICAL_PROGRESSION: NOT CHANGED
CLINICAL_PROGRESSION: NOT CHANGED

## 2017-08-04 ASSESSMENT — PAIN DESCRIPTION - DESCRIPTORS
DESCRIPTORS: ACHING
DESCRIPTORS: CONSTANT;SORE;DISCOMFORT

## 2017-08-05 PROBLEM — S82.142A CLOSED FRACTURE OF LEFT TIBIAL PLATEAU: Status: ACTIVE | Noted: 2017-08-05

## 2017-08-05 LAB
ANION GAP SERPL CALCULATED.3IONS-SCNC: 13 MMOL/L (ref 9–17)
BUN BLDV-MCNC: 10 MG/DL (ref 6–20)
BUN/CREAT BLD: ABNORMAL (ref 9–20)
CALCIUM SERPL-MCNC: 8.9 MG/DL (ref 8.6–10.4)
CHLORIDE BLD-SCNC: 100 MMOL/L (ref 98–107)
CO2: 23 MMOL/L (ref 20–31)
CREAT SERPL-MCNC: 0.42 MG/DL (ref 0.5–0.9)
GFR AFRICAN AMERICAN: >60 ML/MIN
GFR NON-AFRICAN AMERICAN: >60 ML/MIN
GFR SERPL CREATININE-BSD FRML MDRD: ABNORMAL ML/MIN/{1.73_M2}
GFR SERPL CREATININE-BSD FRML MDRD: ABNORMAL ML/MIN/{1.73_M2}
GLUCOSE BLD-MCNC: 110 MG/DL (ref 70–99)
HCT VFR BLD CALC: 32.9 % (ref 36–46)
HEMOGLOBIN: 11.2 G/DL (ref 12–16)
MCH RBC QN AUTO: 29.9 PG (ref 26–34)
MCHC RBC AUTO-ENTMCNC: 34 G/DL (ref 31–37)
MCV RBC AUTO: 88 FL (ref 80–100)
PDW BLD-RTO: 13.2 % (ref 12.5–15.4)
PLATELET # BLD: 278 K/UL (ref 140–450)
PMV BLD AUTO: 9.6 FL (ref 6–12)
POTASSIUM SERPL-SCNC: 3.8 MMOL/L (ref 3.7–5.3)
RBC # BLD: 3.73 M/UL (ref 4–5.2)
SODIUM BLD-SCNC: 136 MMOL/L (ref 135–144)
WBC # BLD: 8.9 K/UL (ref 3.5–11)

## 2017-08-05 PROCEDURE — 85027 COMPLETE CBC AUTOMATED: CPT

## 2017-08-05 PROCEDURE — 6360000002 HC RX W HCPCS: Performed by: STUDENT IN AN ORGANIZED HEALTH CARE EDUCATION/TRAINING PROGRAM

## 2017-08-05 PROCEDURE — 2500000003 HC RX 250 WO HCPCS: Performed by: STUDENT IN AN ORGANIZED HEALTH CARE EDUCATION/TRAINING PROGRAM

## 2017-08-05 PROCEDURE — 6370000000 HC RX 637 (ALT 250 FOR IP): Performed by: STUDENT IN AN ORGANIZED HEALTH CARE EDUCATION/TRAINING PROGRAM

## 2017-08-05 PROCEDURE — 97110 THERAPEUTIC EXERCISES: CPT

## 2017-08-05 PROCEDURE — 2580000003 HC RX 258: Performed by: STUDENT IN AN ORGANIZED HEALTH CARE EDUCATION/TRAINING PROGRAM

## 2017-08-05 PROCEDURE — 97530 THERAPEUTIC ACTIVITIES: CPT

## 2017-08-05 PROCEDURE — 97535 SELF CARE MNGMENT TRAINING: CPT

## 2017-08-05 PROCEDURE — G8988 SELF CARE GOAL STATUS: HCPCS

## 2017-08-05 PROCEDURE — G8979 MOBILITY GOAL STATUS: HCPCS

## 2017-08-05 PROCEDURE — G8978 MOBILITY CURRENT STATUS: HCPCS

## 2017-08-05 PROCEDURE — 80048 BASIC METABOLIC PNL TOTAL CA: CPT

## 2017-08-05 PROCEDURE — 97162 PT EVAL MOD COMPLEX 30 MIN: CPT

## 2017-08-05 PROCEDURE — G8987 SELF CARE CURRENT STATUS: HCPCS

## 2017-08-05 PROCEDURE — 36415 COLL VENOUS BLD VENIPUNCTURE: CPT

## 2017-08-05 PROCEDURE — G0378 HOSPITAL OBSERVATION PER HR: HCPCS

## 2017-08-05 PROCEDURE — 97166 OT EVAL MOD COMPLEX 45 MIN: CPT

## 2017-08-05 RX ORDER — KETOROLAC TROMETHAMINE 15 MG/ML
15 INJECTION, SOLUTION INTRAMUSCULAR; INTRAVENOUS ONCE
Status: COMPLETED | OUTPATIENT
Start: 2017-08-05 | End: 2017-08-05

## 2017-08-05 RX ORDER — KETOROLAC TROMETHAMINE 30 MG/ML
30 INJECTION, SOLUTION INTRAMUSCULAR; INTRAVENOUS EVERY 8 HOURS PRN
Status: DISCONTINUED | OUTPATIENT
Start: 2017-08-05 | End: 2017-08-06 | Stop reason: HOSPADM

## 2017-08-05 RX ADMIN — OXYCODONE HYDROCHLORIDE AND ACETAMINOPHEN 2 TABLET: 5; 325 TABLET ORAL at 19:54

## 2017-08-05 RX ADMIN — DIAZEPAM 2 MG: 2 TABLET ORAL at 11:51

## 2017-08-05 RX ADMIN — MORPHINE SULFATE 4 MG: 4 INJECTION, SOLUTION INTRAMUSCULAR; INTRAVENOUS at 11:01

## 2017-08-05 RX ADMIN — DOCUSATE SODIUM 100 MG: 100 CAPSULE ORAL at 20:53

## 2017-08-05 RX ADMIN — OXYCODONE HYDROCHLORIDE AND ACETAMINOPHEN 2 TABLET: 5; 325 TABLET ORAL at 15:41

## 2017-08-05 RX ADMIN — MORPHINE SULFATE 4 MG: 4 INJECTION, SOLUTION INTRAMUSCULAR; INTRAVENOUS at 04:42

## 2017-08-05 RX ADMIN — OXYCODONE HYDROCHLORIDE AND ACETAMINOPHEN 2 TABLET: 5; 325 TABLET ORAL at 03:33

## 2017-08-05 RX ADMIN — KETOROLAC TROMETHAMINE 30 MG: 30 INJECTION, SOLUTION INTRAMUSCULAR at 19:56

## 2017-08-05 RX ADMIN — MORPHINE SULFATE 4 MG: 4 INJECTION, SOLUTION INTRAMUSCULAR; INTRAVENOUS at 08:12

## 2017-08-05 RX ADMIN — KETOROLAC TROMETHAMINE 15 MG: 15 INJECTION, SOLUTION INTRAMUSCULAR; INTRAVENOUS at 12:10

## 2017-08-05 RX ADMIN — Medication 10 ML: at 20:54

## 2017-08-05 RX ADMIN — DEXTROSE MONOHYDRATE 900 MG: 50 INJECTION, SOLUTION INTRAVENOUS at 03:32

## 2017-08-05 RX ADMIN — ASPIRIN 325 MG: 325 TABLET, COATED ORAL at 09:05

## 2017-08-05 RX ADMIN — OXYCODONE HYDROCHLORIDE AND ACETAMINOPHEN 2 TABLET: 5; 325 TABLET ORAL at 07:34

## 2017-08-05 RX ADMIN — OXYCODONE HYDROCHLORIDE AND ACETAMINOPHEN 2 TABLET: 5; 325 TABLET ORAL at 11:27

## 2017-08-05 RX ADMIN — DOCUSATE SODIUM 100 MG: 100 CAPSULE ORAL at 09:05

## 2017-08-05 ASSESSMENT — PAIN SCALES - GENERAL
PAINLEVEL_OUTOF10: 10
PAINLEVEL_OUTOF10: 8
PAINLEVEL_OUTOF10: 5
PAINLEVEL_OUTOF10: 8
PAINLEVEL_OUTOF10: 7
PAINLEVEL_OUTOF10: 4
PAINLEVEL_OUTOF10: 3
PAINLEVEL_OUTOF10: 7
PAINLEVEL_OUTOF10: 7
PAINLEVEL_OUTOF10: 6
PAINLEVEL_OUTOF10: 10
PAINLEVEL_OUTOF10: 7
PAINLEVEL_OUTOF10: 10
PAINLEVEL_OUTOF10: 7
PAINLEVEL_OUTOF10: 10
PAINLEVEL_OUTOF10: 9
PAINLEVEL_OUTOF10: 8
PAINLEVEL_OUTOF10: 7
PAINLEVEL_OUTOF10: 3
PAINLEVEL_OUTOF10: 7
PAINLEVEL_OUTOF10: 4
PAINLEVEL_OUTOF10: 10

## 2017-08-05 ASSESSMENT — PAIN DESCRIPTION - PROGRESSION
CLINICAL_PROGRESSION: NOT CHANGED

## 2017-08-05 ASSESSMENT — PAIN DESCRIPTION - FREQUENCY
FREQUENCY: CONTINUOUS
FREQUENCY: CONTINUOUS

## 2017-08-05 ASSESSMENT — PAIN DESCRIPTION - ORIENTATION
ORIENTATION: LEFT

## 2017-08-05 ASSESSMENT — PAIN DESCRIPTION - LOCATION
LOCATION: KNEE
LOCATION: LEG;KNEE

## 2017-08-05 ASSESSMENT — PAIN DESCRIPTION - PAIN TYPE
TYPE: ACUTE PAIN;SURGICAL PAIN
TYPE: ACUTE PAIN;SURGICAL PAIN
TYPE: SURGICAL PAIN;ACUTE PAIN

## 2017-08-05 ASSESSMENT — PAIN DESCRIPTION - DESCRIPTORS
DESCRIPTORS: ACHING;CONSTANT;THROBBING
DESCRIPTORS: CONSTANT;SORE;SPASM
DESCRIPTORS: CONSTANT;SPASM
DESCRIPTORS: BURNING;CONSTANT

## 2017-08-05 ASSESSMENT — PAIN DESCRIPTION - ONSET
ONSET: ON-GOING
ONSET: ON-GOING

## 2017-08-06 VITALS
TEMPERATURE: 98.9 F | HEART RATE: 94 BPM | DIASTOLIC BLOOD PRESSURE: 76 MMHG | OXYGEN SATURATION: 100 % | BODY MASS INDEX: 35.36 KG/M2 | WEIGHT: 220 LBS | SYSTOLIC BLOOD PRESSURE: 115 MMHG | RESPIRATION RATE: 15 BRPM | HEIGHT: 66 IN

## 2017-08-06 PROCEDURE — 96376 TX/PRO/DX INJ SAME DRUG ADON: CPT

## 2017-08-06 PROCEDURE — 6370000000 HC RX 637 (ALT 250 FOR IP): Performed by: STUDENT IN AN ORGANIZED HEALTH CARE EDUCATION/TRAINING PROGRAM

## 2017-08-06 PROCEDURE — 2580000003 HC RX 258: Performed by: STUDENT IN AN ORGANIZED HEALTH CARE EDUCATION/TRAINING PROGRAM

## 2017-08-06 PROCEDURE — 6360000002 HC RX W HCPCS: Performed by: STUDENT IN AN ORGANIZED HEALTH CARE EDUCATION/TRAINING PROGRAM

## 2017-08-06 PROCEDURE — G0378 HOSPITAL OBSERVATION PER HR: HCPCS

## 2017-08-06 PROCEDURE — 97530 THERAPEUTIC ACTIVITIES: CPT

## 2017-08-06 PROCEDURE — 97535 SELF CARE MNGMENT TRAINING: CPT

## 2017-08-06 PROCEDURE — 97110 THERAPEUTIC EXERCISES: CPT

## 2017-08-06 RX ADMIN — Medication 10 ML: at 08:08

## 2017-08-06 RX ADMIN — OXYCODONE HYDROCHLORIDE AND ACETAMINOPHEN 2 TABLET: 5; 325 TABLET ORAL at 12:03

## 2017-08-06 RX ADMIN — OXYCODONE HYDROCHLORIDE AND ACETAMINOPHEN 2 TABLET: 5; 325 TABLET ORAL at 08:07

## 2017-08-06 RX ADMIN — KETOROLAC TROMETHAMINE 30 MG: 30 INJECTION, SOLUTION INTRAMUSCULAR at 12:04

## 2017-08-06 RX ADMIN — DOCUSATE SODIUM 100 MG: 100 CAPSULE ORAL at 08:06

## 2017-08-06 RX ADMIN — OXYCODONE HYDROCHLORIDE AND ACETAMINOPHEN 2 TABLET: 5; 325 TABLET ORAL at 00:02

## 2017-08-06 RX ADMIN — ASPIRIN 325 MG: 325 TABLET, COATED ORAL at 08:06

## 2017-08-06 RX ADMIN — OXYCODONE HYDROCHLORIDE AND ACETAMINOPHEN 2 TABLET: 5; 325 TABLET ORAL at 04:00

## 2017-08-06 RX ADMIN — KETOROLAC TROMETHAMINE 30 MG: 30 INJECTION, SOLUTION INTRAMUSCULAR at 04:00

## 2017-08-06 ASSESSMENT — PAIN DESCRIPTION - ORIENTATION
ORIENTATION: LEFT

## 2017-08-06 ASSESSMENT — PAIN DESCRIPTION - PROGRESSION
CLINICAL_PROGRESSION: NOT CHANGED
CLINICAL_PROGRESSION: GRADUALLY IMPROVING
CLINICAL_PROGRESSION: NOT CHANGED

## 2017-08-06 ASSESSMENT — PAIN SCALES - GENERAL
PAINLEVEL_OUTOF10: 7
PAINLEVEL_OUTOF10: 8
PAINLEVEL_OUTOF10: 8
PAINLEVEL_OUTOF10: 7
PAINLEVEL_OUTOF10: 7
PAINLEVEL_OUTOF10: 6
PAINLEVEL_OUTOF10: 5
PAINLEVEL_OUTOF10: 7
PAINLEVEL_OUTOF10: 5
PAINLEVEL_OUTOF10: 4
PAINLEVEL_OUTOF10: 5

## 2017-08-06 ASSESSMENT — PAIN DESCRIPTION - DESCRIPTORS
DESCRIPTORS: CONSTANT
DESCRIPTORS: CONSTANT;SORE

## 2017-08-06 ASSESSMENT — PAIN DESCRIPTION - FREQUENCY
FREQUENCY: CONTINUOUS
FREQUENCY: CONTINUOUS

## 2017-08-06 ASSESSMENT — PAIN DESCRIPTION - LOCATION
LOCATION: KNEE;LEG
LOCATION: LEG;KNEE
LOCATION: KNEE;LEG

## 2017-08-06 ASSESSMENT — PAIN DESCRIPTION - ONSET: ONSET: ON-GOING

## 2017-08-06 ASSESSMENT — PAIN DESCRIPTION - PAIN TYPE
TYPE: ACUTE PAIN;SURGICAL PAIN
TYPE: SURGICAL PAIN
TYPE: ACUTE PAIN;SURGICAL PAIN

## 2017-08-17 ENCOUNTER — OFFICE VISIT (OUTPATIENT)
Dept: ORTHOPEDIC SURGERY | Age: 27
End: 2017-08-17

## 2017-08-17 VITALS — HEIGHT: 66 IN | BODY MASS INDEX: 35.36 KG/M2 | WEIGHT: 220.02 LBS

## 2017-08-17 DIAGNOSIS — S82.142D CLOSED FRACTURE OF LEFT TIBIAL PLATEAU, WITH ROUTINE HEALING, SUBSEQUENT ENCOUNTER: Primary | ICD-10-CM

## 2017-08-17 PROCEDURE — 99024 POSTOP FOLLOW-UP VISIT: CPT | Performed by: ORTHOPAEDIC SURGERY

## 2017-08-17 RX ORDER — OXYCODONE HYDROCHLORIDE AND ACETAMINOPHEN 5; 325 MG/1; MG/1
1 TABLET ORAL EVERY 4 HOURS PRN
Qty: 40 TABLET | Refills: 0 | Status: SHIPPED | OUTPATIENT
Start: 2017-08-17

## 2017-08-22 ENCOUNTER — HOSPITAL ENCOUNTER (OUTPATIENT)
Dept: PHYSICAL THERAPY | Facility: CLINIC | Age: 27
Setting detail: THERAPIES SERIES
Discharge: HOME | End: 2017-08-22

## 2017-08-22 PROCEDURE — 97110 THERAPEUTIC EXERCISES: CPT

## 2017-08-22 PROCEDURE — 97162 PT EVAL MOD COMPLEX 30 MIN: CPT

## 2017-08-24 ENCOUNTER — HOSPITAL ENCOUNTER (OUTPATIENT)
Dept: PHYSICAL THERAPY | Facility: CLINIC | Age: 27
Setting detail: THERAPIES SERIES
Discharge: HOME | End: 2017-08-24

## 2017-08-24 PROCEDURE — 97110 THERAPEUTIC EXERCISES: CPT

## 2017-08-29 ENCOUNTER — HOSPITAL ENCOUNTER (OUTPATIENT)
Dept: PHYSICAL THERAPY | Facility: CLINIC | Age: 27
Setting detail: THERAPIES SERIES
Discharge: HOME | End: 2017-08-29

## 2017-08-29 PROCEDURE — 97110 THERAPEUTIC EXERCISES: CPT

## 2017-08-30 ENCOUNTER — HOSPITAL ENCOUNTER (OUTPATIENT)
Age: 27
Discharge: HOME | End: 2017-08-30

## 2017-08-30 ENCOUNTER — HOSPITAL ENCOUNTER (OUTPATIENT)
Dept: GENERAL RADIOLOGY | Age: 27
Discharge: HOME | End: 2017-08-30

## 2017-08-30 DIAGNOSIS — S82.142D CLOSED FRACTURE OF LEFT TIBIAL PLATEAU, WITH ROUTINE HEALING, SUBSEQUENT ENCOUNTER: ICD-10-CM

## 2017-08-30 PROCEDURE — 73562 X-RAY EXAM OF KNEE 3: CPT

## 2017-08-30 NOTE — FLOWSHEET NOTE
[] [facility]        Outpatient Physical                Therapy       [address]       Phone: [phone]       Fax: [phone] [] [facility] for Health       Promotion at [address] [facility]       Phone: [phone]       Fax: [phone] [] Adriana Mckeon      for Health Promotion     [address] [facility]      Phone: [phone]      Fax:  [phone]     Physical Therapy Cancel/No Show note    Date: 2017  Patient: Raz Ovalle  : 1990  MRN: [id number]    Cancels/No Shows to date: 1cx/0ns    For 2017 appointment patient:  [x]  Cancelled  []  Rescheduled appointment  []  No-show     Reason given by patient:  []  Patient ill  [x]  Conflicting appointment  []  No transportation    []  Conflict with work  []  No reason given  []  Weather related  []  Other:     Comments:  Next appointments 17 @ 945am & 17 @ 945am confirmed.   Electronically signed by: Jhon Will

## 2017-08-31 ENCOUNTER — HOSPITAL ENCOUNTER (OUTPATIENT)
Dept: PHYSICAL THERAPY | Facility: CLINIC | Age: 27
Setting detail: THERAPIES SERIES
Discharge: HOME | End: 2017-08-31

## 2017-08-31 ENCOUNTER — OFFICE VISIT (OUTPATIENT)
Dept: ORTHOPEDIC SURGERY | Age: 27
End: 2017-08-31

## 2017-08-31 VITALS — HEIGHT: 66 IN | WEIGHT: 220.02 LBS | BODY MASS INDEX: 35.36 KG/M2

## 2017-08-31 DIAGNOSIS — S82.142D CLOSED FRACTURE OF LEFT TIBIAL PLATEAU, WITH ROUTINE HEALING, SUBSEQUENT ENCOUNTER: Primary | ICD-10-CM

## 2017-08-31 PROCEDURE — 99024 POSTOP FOLLOW-UP VISIT: CPT | Performed by: ORTHOPAEDIC SURGERY

## 2017-09-05 ENCOUNTER — HOSPITAL ENCOUNTER (OUTPATIENT)
Dept: PHYSICAL THERAPY | Facility: CLINIC | Age: 27
Setting detail: THERAPIES SERIES
Discharge: HOME | End: 2017-09-05

## 2017-09-05 PROCEDURE — 97110 THERAPEUTIC EXERCISES: CPT

## 2017-09-07 ENCOUNTER — HOSPITAL ENCOUNTER (OUTPATIENT)
Dept: PHYSICAL THERAPY | Facility: CLINIC | Age: 27
Setting detail: THERAPIES SERIES
Discharge: HOME | End: 2017-09-07

## 2017-09-07 PROCEDURE — 97110 THERAPEUTIC EXERCISES: CPT

## 2017-09-12 ENCOUNTER — HOSPITAL ENCOUNTER (OUTPATIENT)
Dept: PHYSICAL THERAPY | Facility: CLINIC | Age: 27
Setting detail: THERAPIES SERIES
Discharge: HOME | End: 2017-09-12

## 2017-09-12 PROCEDURE — 97110 THERAPEUTIC EXERCISES: CPT

## 2017-09-14 ENCOUNTER — HOSPITAL ENCOUNTER (OUTPATIENT)
Dept: PHYSICAL THERAPY | Facility: CLINIC | Age: 27
Setting detail: THERAPIES SERIES
Discharge: HOME | End: 2017-09-14

## 2017-09-14 PROCEDURE — 97110 THERAPEUTIC EXERCISES: CPT

## 2017-09-19 ENCOUNTER — HOSPITAL ENCOUNTER (OUTPATIENT)
Dept: PHYSICAL THERAPY | Facility: CLINIC | Age: 27
Setting detail: THERAPIES SERIES
Discharge: HOME | End: 2017-09-19

## 2017-09-19 PROCEDURE — 97110 THERAPEUTIC EXERCISES: CPT

## 2017-09-21 ENCOUNTER — HOSPITAL ENCOUNTER (OUTPATIENT)
Dept: PHYSICAL THERAPY | Facility: CLINIC | Age: 27
Setting detail: THERAPIES SERIES
Discharge: HOME | End: 2017-09-21

## 2017-09-21 PROCEDURE — 97110 THERAPEUTIC EXERCISES: CPT

## 2017-09-26 ENCOUNTER — HOSPITAL ENCOUNTER (OUTPATIENT)
Dept: PHYSICAL THERAPY | Facility: CLINIC | Age: 27
Setting detail: THERAPIES SERIES
Discharge: HOME | End: 2017-09-26

## 2017-09-26 PROCEDURE — 97110 THERAPEUTIC EXERCISES: CPT

## 2017-09-27 ENCOUNTER — HOSPITAL ENCOUNTER (OUTPATIENT)
Dept: GENERAL RADIOLOGY | Age: 27
Discharge: HOME | End: 2017-09-27

## 2017-09-27 ENCOUNTER — HOSPITAL ENCOUNTER (OUTPATIENT)
Age: 27
Discharge: HOME | End: 2017-09-27

## 2017-09-27 DIAGNOSIS — S82.142D CLOSED FRACTURE OF LEFT TIBIAL PLATEAU, WITH ROUTINE HEALING, SUBSEQUENT ENCOUNTER: ICD-10-CM

## 2017-09-27 DIAGNOSIS — S82.142D CLOSED FRACTURE OF LEFT TIBIAL PLATEAU, WITH ROUTINE HEALING, SUBSEQUENT ENCOUNTER: Primary | ICD-10-CM

## 2017-09-27 PROCEDURE — 73562 X-RAY EXAM OF KNEE 3: CPT

## 2017-09-28 ENCOUNTER — HOSPITAL ENCOUNTER (OUTPATIENT)
Dept: PHYSICAL THERAPY | Facility: CLINIC | Age: 27
Setting detail: THERAPIES SERIES
Discharge: HOME | End: 2017-09-28

## 2017-09-28 ENCOUNTER — OFFICE VISIT (OUTPATIENT)
Dept: ORTHOPEDIC SURGERY | Age: 27
End: 2017-09-28

## 2017-09-28 VITALS — WEIGHT: 220.02 LBS | HEIGHT: 66 IN | BODY MASS INDEX: 35.36 KG/M2

## 2017-09-28 DIAGNOSIS — S82.142D CLOSED FRACTURE OF LEFT TIBIAL PLATEAU, WITH ROUTINE HEALING, SUBSEQUENT ENCOUNTER: Primary | ICD-10-CM

## 2017-09-28 PROCEDURE — 97116 GAIT TRAINING THERAPY: CPT

## 2017-09-28 PROCEDURE — 99024 POSTOP FOLLOW-UP VISIT: CPT | Performed by: ORTHOPAEDIC SURGERY

## 2017-10-03 ENCOUNTER — HOSPITAL ENCOUNTER (OUTPATIENT)
Dept: PHYSICAL THERAPY | Facility: CLINIC | Age: 27
Setting detail: THERAPIES SERIES
Discharge: HOME | End: 2017-10-03

## 2017-10-03 PROCEDURE — 97116 GAIT TRAINING THERAPY: CPT

## 2017-10-03 PROCEDURE — 97110 THERAPEUTIC EXERCISES: CPT

## 2017-10-03 NOTE — FLOWSHEET NOTE
[] Kendra Guerra       Outpatient Physical        Therapy       [address]       Phone: [phone]       Fax: [phone] [x] [facility] Promotion at [address]       Phone: [phone]       Fax: [phone] [] Lizette. [facility] Health Promotion  [address] [place] [address]   Phone: [phone]   Fax:  [phone]     Physical Therapy Daily Treatment Note    Date:  10/3/2017  Patient Name:  Rae Gibbs    :  1990  MRN: [id number]  Physician: Dr. Ranjit Vera: Hira Riggs Diagnosis: Displaced bicondylar fracture of left tibia, subsequent encounter for closed fracture with routine healing                                   Rehab Codes: R60.0, M25.562, M25.662, R26.2NEC, M62.552, M62.81  Onset date: 17; 17 (surgery)                                          Next Dr's appt.: 17     Visit# / total visits:  (updated 10/3/17)  Cancels/No Shows: 0    Subjective:   Pain:  [] Yes  [] No Location: L leg Pain Rating: (0-10 scale) 0/10  Pain altered Tx:  [] No  [] Yes  Action:  Comments: Pt reports her normal morning stiffness in the L leg this date. Pt reports walking on the leg is getting better and she notes more of an ache when walking initially. Pt notes the first few days of walking she had an increase in discomfort, but nothing significant and it improved the last few days. Pt also states she has had an increase in wrist pain with the use of crutches, however, she is able to stand for about 1-2 minutes to rest. Pt is to progress weightbearing this date by 20%. Objective: Arrives with both shoes donned.    Modalities: deferred ice  Precautions: 40%  Exercises: bolded completed  Exercise Reps/ Time Weight/ Level/Color Comments   Upright bike 10' S4 Able to complete full revolutions forward/backward   Heel slides-wall 30x   110 deg Prior HEP/Ed  Method of Education: [x] Verbal  [] Demo  [] Written: green band provided for TKE  Comprehension of Education:  [x] Verbalizes understanding. [] Demonstrates understanding. [] Needs review. [x] Demonstrates/verbalizes HEP/Ed previously given. Plan: [x] Continue per plan of care.    [] Other:      Time In: 9:42 a            Time Out: 10:35 a    Electronically signed by:  Thomas Pedroza PT

## 2017-10-06 ENCOUNTER — HOSPITAL ENCOUNTER (OUTPATIENT)
Dept: PHYSICAL THERAPY | Facility: CLINIC | Age: 27
Setting detail: THERAPIES SERIES
Discharge: HOME | End: 2017-10-06

## 2017-10-06 PROCEDURE — 97110 THERAPEUTIC EXERCISES: CPT

## 2017-10-09 ENCOUNTER — APPOINTMENT (OUTPATIENT)
Dept: PHYSICAL THERAPY | Facility: CLINIC | Age: 27
End: 2017-10-09

## 2017-10-10 ENCOUNTER — HOSPITAL ENCOUNTER (OUTPATIENT)
Dept: PHYSICAL THERAPY | Facility: CLINIC | Age: 27
Setting detail: THERAPIES SERIES
Discharge: HOME | End: 2017-10-10

## 2017-10-10 PROCEDURE — 97110 THERAPEUTIC EXERCISES: CPT

## 2017-10-10 NOTE — FLOWSHEET NOTE
[] Edgar Solano       Outpatient Physical        Therapy       [address]       Phone: [phone]       Fax: [phone] [x] [facility] at [address]       Phone: [phone]       Fax: [phone] [] Lizette. [address] [place] [address] Health Promotion  [address] [place] [address]   Phone: [phone]   Fax:  [phone]     Physical Therapy Daily Treatment Note    Date:  10/10/2017  Patient Name:  Radha Wolf    :  1990  MRN: [id number]  Physician: Dr. Frederick Nissen: Renaldo Madrigal Diagnosis: Displaced bicondylar fracture of left tibia, subsequent encounter for closed fracture with routine healing                                   Rehab Codes: R60.0, M25.562, M25.662, R26.2NEC, M62.552, M62.81  Onset date: 17; 17 (surgery)                                          Next Dr's appt.: 10/17/17     Visit# / total visits:  (updated 10/3/17)  Cancels/No Shows: 0    Subjective:   Pain:  [] Yes  [] No Location: L leg Pain Rating: (0-10 scale) (sore)/10 (tightness- when woke up)  Pain altered Tx:  [] No  [] Yes  Action:  Comments: Pt reports she progressed her WBing this weekend to 60% with good tolerance however, the muscles felt weak. Pt denies any pain with this progression. Pt notes normal discomfort this morning but states when it rains, the LLE is very sore. Objective: Arrives with both shoes donned.    Modalities: deferred ice  Precautions: 40%  Exercises: bolded completed  Exercise Reps/ Time Weight/ Level/Color Comments   Upright bike 10' S4 Able to complete full revolutions forward/backward   Heel slides-wall 30x   110 deg   Heel slides sustained holds  17    A: 114 deg   P: 117 deg     Patellar mobs x  Medial/inferior/superior patellar mobs completed due to hypomobility  Added 10/10   Quad sets  x   towel roll under the ankle  Without towel roll- hand under knee for tactile cueing  Completed with over pressure into extension   Seated calf stretch 3x30\" towel    Supine hamstring stretch 3x30\" strap    Prone quad stretch 5x30\" strap    Hamstring curl 2x10 red Added 10/10   LAQ 3x10 3# Increased weight 10/10         TOTAL GYM   Increased level 10/10   DL squats 2x20 L7    DL heel raises 2x20 L7 Both feet symmetrical (decreased heel height on L when completing with staggered feet)   SL squats 25x L4 Added 10/10   Up 2 down 1 HR 3x10 L4 Added 10/10   TKE 30x2 black Progressed 10/6     Gait Training: ambulating with crutches at 40% WBing, 150';   - Use of scale to determine 40% PWB (had pt complete this15)    Stairs: 4x8 steps with crutchs- \"up with good, down with bad\"- SBA (crutches in clinic were too tall, pt instructed to bring crutches from home)  Verbal and visual demonstration    Reviewed proper gait mechanics while maintaining WBing precautions for ambulation and stairs; chart provided to pt on proper progressing of WBing and how to use a scale to determine WBing    Specific Instructions for next treatment: PWB    Treatment Charges: Mins Units   []  Modalities     [x]  Ther Exercise 45 2   []  Manual Therapy     []  Ther Activities     []  Aquatics     []  Vasocompression     []  Other Gait training     Total Treatment time 45 2       Assessment: [x] Progressing toward goals. Pt notes some soreness at the end of the session but reports it dissipates within 15 minutes. Pt able to complete increased repetitions of exercises with good tolerance. Pt was able to demonstrate independence with progressed WBing as demonstrated through ambulation with crutches. Pt was able to progress levels on total gym this date with a decreased in heel height with heel raises due to weakness. Pt was also able to progress to SL and eccentric strengthening with notable muscle fatigue in the LLE.  Pt able to demonstrate improvements with AROM of L knee flexion this date and was able to progress to hamstring curls, however, anterior shin pain was noted. Pt was able to progress strengthening exercises with good tolerance and continued quad weakness as muscle fatigue was noted. [] No change. [] Other:    Short Term Goals: MEET IN 6 VISITS Status   Pain: Pt will report less than or equal to 2/10 LLE pain with therapeutic interventions in order to improve LLE ROM and strength to prepare for change in WB status. Ongoing 9/14/17 (3-4/10, dull ache)   ROM: Pt will improve L knee AROM to 0-75 degrees in order to improve L knee mobility to prepare for ambulation. MET 9/14/17  0-95 deg   Strength: Pt will be able to complete 10 quad sets without a towel roll under the L knee in order to improve quad activation to prepare for ambulation. MET 9/14/17  (towel roll under the ankle)   HEP: Pt will be independent in with HEP. MET 9/14/17   Long Term Goal: MEET IN 12 VISITS     Pain: Pt will report less than or equal to 1/10 LLE pain with completion of ADLs while maintaining NWBing status. Ongoing   ROM: Pt will improve L knee AROM to 0-95 degrees in order to prepare for WBing on LLE  in order to climb stairs, ambulate, and progress to prior level of activity. Ongoing   Strength: Pt will demonstrate 4+/5 LLE strength in order to prepare for WBing on LLE in order to climb stairs, ambulate, and progress to prior level of activity. Ongoing   Outcome Measure: Pt will report greater than or equal to 20% on the LEFI in order to demonstrate improved function. Ongoing       Patient goals:  mobility and flexibility and independence back- can't get into the car an go somewhere         Pt. Education:  [x] Yes  [] No  [x] Reviewed Prior HEP/Ed  Method of Education: [x] Verbal  [] Demo  [] Written: green band provided for TKE  Comprehension of Education:  [x] Verbalizes understanding. [] Demonstrates understanding. [] Needs review. [x] Demonstrates/verbalizes HEP/Ed previously given.      Plan: [x] Continue per plan of care.    [] Other:      Time In: 9:58 a            Time Out: 11:00 a    Electronically signed by:  Tad Sommer PT

## 2017-10-12 ENCOUNTER — HOSPITAL ENCOUNTER (OUTPATIENT)
Dept: PHYSICAL THERAPY | Facility: CLINIC | Age: 27
Setting detail: THERAPIES SERIES
Discharge: HOME | End: 2017-10-12

## 2017-10-12 PROCEDURE — 97110 THERAPEUTIC EXERCISES: CPT

## 2017-10-12 NOTE — FLOWSHEET NOTE
[] Lai Sorto       Outpatient Physical        Therapy       [address]       Phone: [phone]       Fax: [phone] [x] Merged with [facility] for Health Promotion at [address]       Phone: [phone]       Fax: [phone] [] [facility]. [facility] for Health Promotion  [address] [facility]   Phone: [phone]   Fax:  [phone]     Physical Therapy Daily Treatment Note    Date:  10/12/2017  Patient Name:  Flor Russell    :  1990  MRN: [id number]  Physician: Dr. Shabnam Amato: Bernardo Ortiz Diagnosis: Displaced bicondylar fracture of left tibia, subsequent encounter for closed fracture with routine healing                                   Rehab Codes: R60.0, M25.562, M25.662, R26.2NEC, M62.552, M62.81  Onset date: 17; 17 (surgery)                                          Next Dr's appt.: 10/17/17     Visit# / total visits: 15/20 (updated 10/3/17)  Cancels/No Shows: 0    Subjective:   Pain:  [] Yes  [] No Location: L leg Pain Rating: (0-10 scale) (sore)/10 (tightness- when woke up)  Pain altered Tx:  [] No  [] Yes  Action:  Comments: Pt notes her L leg was sore yesterday due to the rain. Objective: Arrives with both shoes donned.    Modalities: deferred ice  Precautions: 40%  Exercises: bolded completed  Exercise Reps/ Time Weight/ Level/Color Comments   Upright bike 10' S3 Partial revolutions to work on motion   Heel slides-wall 30x   110 deg   Heel slides sustained holds  10/12/17  A: 114 deg   P: 119 deg     Patellar mobs x  Medial/inferior/superior patellar mobs completed due to hypomobility  Added 10/10   Quad sets  x   towel roll under the ankle  Without towel roll- hand under knee for tactile cueing  Completed with over pressure into extension   Seated calf stretch 3x30\" towel    Supine hamstring stretch 3x30\" strap    Prone quad stretch 5x30\" strap    Hamstring curl 2x10 red Added 10/10   LAQ 4x10 3# Increased reps 10/12         TOTAL GYM   Increased level 10/10   DL squats 30x L7 For motion   DL heel raises 30x L7 Both feet symmetrical (decreased heel height on L when completing with staggered feet)   SL squats 25x L4 Added 10/10   Up 2 down 1 HR 3x10 L4 Added 10/10   TKE 30x2 black Progressed 10/6     Gait Training: ambulating with crutches at 40% WBing, 150';   - Use of scale to determine 40% PWB (had pt complete this15)    Stairs: 4x8 steps with crutchs- \"up with good, down with bad\"- SBA (crutches in clinic were too tall, pt instructed to bring crutches from home)  Verbal and visual demonstration    Reviewed proper gait mechanics while maintaining WBing precautions for ambulation and stairs; chart provided to pt on proper progressing of WBing and how to use a scale to determine WBing    Specific Instructions for next treatment: PWB    Treatment Charges: Mins Units   []  Modalities     [x]  Ther Exercise 35 2   []  Manual Therapy     []  Ther Activities     []  Aquatics     []  Vasocompression     []  Other Gait training     Total Treatment time 35 2       Assessment: [x] Progressing toward goals. Pt denies pain at the end of this session and noted slight stiffness in the L knee with AROM prior to PROM. Pt was able to complete intermittent full revolutions with a decrease in seat height on the bike this date. Pt also was able to complete strengthening exercises with good tolerance, however, weakness is still noted. [] No change. [] Other:    Short Term Goals: MEET IN 6 VISITS Status   Pain: Pt will report less than or equal to 2/10 LLE pain with therapeutic interventions in order to improve LLE ROM and strength to prepare for change in WB status. Ongoing 9/14/17 (3-4/10, dull ache)   ROM: Pt will improve L knee AROM to 0-75 degrees in order to improve L knee mobility to prepare for ambulation.  MET 9/14/17  0-95 deg   Strength: Pt will be able to complete 10 quad sets without a towel roll under the L knee in order to improve quad activation to prepare for ambulation. MET 9/14/17  (towel roll under the ankle)   HEP: Pt will be independent in with HEP. MET 9/14/17   Long Term Goal: MEET IN 12 VISITS     Pain: Pt will report less than or equal to 1/10 LLE pain with completion of ADLs while maintaining NWBing status. Ongoing   ROM: Pt will improve L knee AROM to 0-95 degrees in order to prepare for WBing on LLE  in order to climb stairs, ambulate, and progress to prior level of activity. Ongoing   Strength: Pt will demonstrate 4+/5 LLE strength in order to prepare for WBing on LLE in order to climb stairs, ambulate, and progress to prior level of activity. Ongoing   Outcome Measure: Pt will report greater than or equal to 20% on the LEFI in order to demonstrate improved function. Ongoing       Patient goals:  mobility and flexibility and independence back- can't get into the car an go somewhere         Pt. Education:  [x] Yes  [] No  [x] Reviewed Prior HEP/Ed  Method of Education: [x] Verbal  [] Demo  [] Written: for exercises to complete at home: [place] with ankle weight  Comprehension of Education:  [x] Verbalizes understanding. [] Demonstrates understanding. [] Needs review. [x] Demonstrates/verbalizes HEP/Ed previously given. Plan: [x] Continue per plan of care.    [] Other:      Time In: 9:40 a            Time Out: 10:31 a    Electronically signed by:  Fariba Nielsen PT

## 2017-10-16 ENCOUNTER — HOSPITAL ENCOUNTER (OUTPATIENT)
Age: 27
Discharge: HOME | End: 2017-10-16

## 2017-10-16 ENCOUNTER — HOSPITAL ENCOUNTER (OUTPATIENT)
Dept: GENERAL RADIOLOGY | Age: 27
Discharge: HOME | End: 2017-10-16

## 2017-10-16 DIAGNOSIS — S82.142D CLOSED FRACTURE OF LEFT TIBIAL PLATEAU, WITH ROUTINE HEALING, SUBSEQUENT ENCOUNTER: ICD-10-CM

## 2017-10-16 PROCEDURE — 73562 X-RAY EXAM OF KNEE 3: CPT

## 2017-10-17 ENCOUNTER — HOSPITAL ENCOUNTER (OUTPATIENT)
Dept: PHYSICAL THERAPY | Facility: CLINIC | Age: 27
Setting detail: THERAPIES SERIES
Discharge: HOME | End: 2017-10-17

## 2017-10-17 PROCEDURE — 97110 THERAPEUTIC EXERCISES: CPT

## 2017-10-17 NOTE — FLOWSHEET NOTE
hand under knee for tactile cueing  Completed with over pressure into extension   Supine hamstring stretch 3x30\" strap    Stability ball bridges 3x10 Small blue Added 10/17   Prone quad stretch 5x30\" strap    Hamstring curl 3x10 4# Added 10/10   LAQ 2x30 4# Increased reps 10/12         TOTAL GYM   Increased level 10/10   DL squats 30x L7 For motion   DL heel raises 30x L7 Staggered feet   SL squats 30x L5 Increased level 10/17   Up 2 down 1 HR 3x10 L5 Increased level 10/17   TKE 30x2 black Progressed 10/6         Specific Instructions for next treatment: PWB    Treatment Charges: Mins Units   []  Modalities     [x]  Ther Exercise 31 2   []  Manual Therapy     []  Ther Activities     []  Aquatics     []  Vasocompression     []  Other Gait training     Total Treatment time 31 2       Assessment: [x] Progressing toward goals. Pt intermittently reports increased pressure/ache in the L knee joint with PWB squats with quad activation. Pt able to progress PWB SL activities with improved tolerance with continued deficits in LLE strength. Pt was also able to progress glute strengthening exercises with good tolerance and advised to complete at home. Pt will return to 100% WBing tomorrow and pt will be progressed to [address] exercises at her next appointment. [] No change. [] Other:    Short Term Goals: MEET IN 6 VISITS Status   Pain: Pt will report less than or equal to 2/10 LLE pain with therapeutic interventions in order to improve LLE ROM and strength to prepare for change in WB status. Ongoing 9/14/17 (3-4/10, dull ache)   ROM: Pt will improve L knee AROM to 0-75 degrees in order to improve L knee mobility to prepare for ambulation. MET 9/14/17  0-95 deg   Strength: Pt will be able to complete 10 quad sets without a towel roll under the L knee in order to improve quad activation to prepare for ambulation. MET 9/14/17  (towel roll under the ankle)   HEP: Pt will be independent in with HEP.  MET 9/14/17   Long Term Goal: MEET IN 12 VISITS     Pain: Pt will report less than or equal to 1/10 LLE pain with completion of ADLs while maintaining NWBing status. Ongoing   ROM: Pt will improve L knee AROM to 0-95 degrees in order to prepare for WBing on LLE  in order to climb stairs, ambulate, and progress to prior level of activity. Ongoing   Strength: Pt will demonstrate 4+/5 LLE strength in order to prepare for WBing on LLE in order to climb stairs, ambulate, and progress to prior level of activity. Ongoing   Outcome Measure: Pt will report greater than or equal to 20% on the LEFI in order to demonstrate improved function. Ongoing       Patient goals:  mobility and flexibility and independence back- can't get into the car an go somewhere         Pt. Education:  [x] Yes  [] No  [x] Reviewed Prior HEP/Ed  Method of Education: [x] Verbal  [] Demo  [] Written: for exercises to complete at home: [place] with ankle weight  Comprehension of Education:  [x] Verbalizes understanding. [] Demonstrates understanding. [] Needs review. [x] Demonstrates/verbalizes HEP/Ed previously given. Plan: [x] Continue per plan of care.    [] Other:      Time In: 9:45 a            Time Out: 10:31 a    Electronically signed by:  Brenda Au PT

## 2017-10-19 ENCOUNTER — TELEPHONE (OUTPATIENT)
Dept: ORTHOPEDIC SURGERY | Age: 27
End: 2017-10-19

## 2017-10-19 ENCOUNTER — HOSPITAL ENCOUNTER (OUTPATIENT)
Dept: PHYSICAL THERAPY | Facility: CLINIC | Age: 27
Setting detail: THERAPIES SERIES
Discharge: HOME | End: 2017-10-19

## 2017-10-19 DIAGNOSIS — S82.142D CLOSED FRACTURE OF LEFT TIBIAL PLATEAU WITH ROUTINE HEALING, SUBSEQUENT ENCOUNTER: Primary | ICD-10-CM

## 2017-10-19 PROCEDURE — 97110 THERAPEUTIC EXERCISES: CPT

## 2017-10-19 NOTE — FLOWSHEET NOTE
9/14/17 (3-4/10, dull ache)   ROM: Pt will improve L knee AROM to 0-75 degrees in order to improve L knee mobility to prepare for ambulation. MET 9/14/17  0-95 deg   Strength: Pt will be able to complete 10 quad sets without a towel roll under the L knee in order to improve quad activation to prepare for ambulation. MET 9/14/17  (towel roll under the ankle)   HEP: Pt will be independent in with HEP. MET 9/14/17   Long Term Goal: MEET IN 12 VISITS     Pain: Pt will report less than or equal to 1/10 LLE pain with completion of ADLs while maintaining NWBing status. Ongoing   ROM: Pt will improve L knee AROM to 0-95 degrees in order to prepare for WBing on LLE  in order to climb stairs, ambulate, and progress to prior level of activity. Ongoing   Strength: Pt will demonstrate 4+/5 LLE strength in order to prepare for WBing on LLE in order to climb stairs, ambulate, and progress to prior level of activity. Ongoing   Outcome Measure: Pt will report greater than or equal to 20% on the LEFI in order to demonstrate improved function. Ongoing       Patient goals:  mobility and flexibility and independence back- can't get into the car an go somewhere         Pt. Education:  [x] Yes  [] No  [x] Reviewed Prior HEP/Ed  Method of Education: [x] Verbal  [] Demo  [] Written: for exercises to complete at home: [place] with ankle weight  Comprehension of Education:  [x] Verbalizes understanding. [] Demonstrates understanding. [] Needs review. [x] Demonstrates/verbalizes HEP/Ed previously given. Plan: [x] Continue per plan of care.    [] Other:      Time In: 9:15 a            Time Out: 10:02 a    Electronically signed by:  Jono Alcantar, PT

## 2017-10-24 ENCOUNTER — HOSPITAL ENCOUNTER (OUTPATIENT)
Dept: PHYSICAL THERAPY | Facility: CLINIC | Age: 27
Setting detail: THERAPIES SERIES
Discharge: HOME | End: 2017-10-24

## 2017-10-24 PROCEDURE — 97110 THERAPEUTIC EXERCISES: CPT

## 2017-10-24 NOTE — FLOWSHEET NOTE
stretch 3x30\"     Step ups 3x10 aerobic Fwd/lat  Added 10/19   Step downs 2x10 aerobic Added 10/24   Tandem balance 3x30\"  Added 10/19   squats 20x  Staggered feet  Added 10/19   Heel raises 20x  Added 10/24   Wall sits 3x20\"  Added 10/24   TOTAL GYM   Increased level 10/10   DL squats 30x L8 For motion  100 deg   SL squats 3x10 L7 Increased level 10/24   SL heel raises 3x8 L7 Progressed level 10/24   TKE 30x black Progressed 10/6     Gait training: unilateral crutching under R arm    Specific Instructions for next treatment:     Treatment Charges: Mins Units   []  Modalities     [x]  Ther Exercise 30 2   []  Manual Therapy     []  Ther Activities     []  Aquatics     []  Vasocompression     [x]  Other Gait training 5 0   Total Treatment time 35 2       Assessment: [x] Progressing toward goals. Pt continues to demonstrate decreased quad strength on the LLE, however, improvements have been noted from previous session. Pt was able to progress strengthening exercises without complaints of pain. Pt also required cueing to improve terminal hip extension to promote push-off on the LLE when ambulating. [] No change. [] Other:    Short Term Goals: MEET IN 6 VISITS Status   Pain: Pt will report less than or equal to 2/10 LLE pain with therapeutic interventions in order to improve LLE ROM and strength to prepare for change in WB status. Ongoing 9/14/17 (3-4/10, dull ache)   ROM: Pt will improve L knee AROM to 0-75 degrees in order to improve L knee mobility to prepare for ambulation. MET 9/14/17  0-95 deg   Strength: Pt will be able to complete 10 quad sets without a towel roll under the L knee in order to improve quad activation to prepare for ambulation. MET 9/14/17  (towel roll under the ankle)   HEP: Pt will be independent in with HEP. MET 9/14/17   Long Term Goal: MEET IN 12 VISITS     Pain: Pt will report less than or equal to 1/10 LLE pain with completion of ADLs while maintaining NWBing status.  Ongoing   ROM: Pt will improve L knee AROM to 0-95 degrees in order to prepare for WBing on LLE  in order to climb stairs, ambulate, and progress to prior level of activity. Ongoing   Strength: Pt will demonstrate 4+/5 LLE strength in order to prepare for WBing on LLE in order to climb stairs, ambulate, and progress to prior level of activity. Ongoing   Outcome Measure: Pt will report greater than or equal to 20% on the LEFI in order to demonstrate improved function. Ongoing       Patient goals:  mobility and flexibility and independence back- can't get into the car an go somewhere         Pt. Education:  [x] Yes  [] No  [x] Reviewed Prior HEP/Ed  Method of Education: [x] Verbal  [] Demo  [] Written: for exercises to complete at home: [place] with ankle weight  Comprehension of Education:  [x] Verbalizes understanding. [] Demonstrates understanding. [] Needs review. [x] Demonstrates/verbalizes HEP/Ed previously given. Plan: [x] Continue per plan of care.    [] Other:      Time In: 9:46 a            Time Out: 10:29 a    Electronically signed by:  Amor Smallwood, PT

## 2017-10-26 ENCOUNTER — HOSPITAL ENCOUNTER (OUTPATIENT)
Dept: PHYSICAL THERAPY | Facility: CLINIC | Age: 27
Setting detail: THERAPIES SERIES
Discharge: HOME | End: 2017-10-26

## 2017-10-26 PROCEDURE — 97110 THERAPEUTIC EXERCISES: CPT

## 2017-10-26 NOTE — FLOWSHEET NOTE
[] [facility]       Outpatient Physical        Therapy       [address]       Phone: [phone]       Fax: [phone] [x] [facility] at [address]       Phone: [phone]       Fax: [phone] [] Lizette. [address] [place] [address] for Health Promotion  [address] [place] [address]   Phone: [phone]   Fax:  [phone]     Physical Therapy Daily Treatment Note    Date:  10/26/2017  Patient Name:  Rae Gibbs    :  1990  MRN: [id number]  Physician: Dr. Ranjit Vera: Hira Riggs Diagnosis: Displaced bicondylar fracture of left tibia, subsequent encounter for closed fracture with routine healing                                   Rehab Codes: R60.0, M25.562, M25.662, R26.2NEC, M62.552, M62.81  Onset date: 17; 17 (surgery)                                          Next Dr's appt.: 10/31/17     Visit# / total visits:  (updated 10/3/17)  Cancels/No Shows: 0    Subjective:   Pain:  [] Yes  [] No Location: L leg Pain Rating: (0-10 scale) (sore)/10 (tightness- when woke up)  Pain altered Tx:  [] No  [] Yes  Action:  Comments: Pt denies pain this date but notes she was sore and tired after the last session. Objective: Arrives with both shoes donned.    Modalities: deferred ice  Precautions: 100%  Exercises: bolded completed  Exercise Reps/ Time Weight/ Level/Color Comments   Upright bike 5' S1 Partial revolutions to work on motion   TM 5'    3' 1.2-1.4    0.6 Added 10/19  Cues for heel strike and push-off, increase hip extension     Heel slides sustained holds  10/12/17  A: 114 deg   P: 119 deg     Patellar mobs x  Medial/inferior/superior patellar mobs completed due to hypomobility  Added 10/10         Standing   Added 10/19   Calf stretch 3x30\"     Step ups 2x10 5\" step Fwd/lat  Added 10/19   Step downs 2x10 5\" step Added 10/24   Tandem balance

## 2017-10-30 ENCOUNTER — HOSPITAL ENCOUNTER (OUTPATIENT)
Dept: GENERAL RADIOLOGY | Age: 27
Discharge: HOME | End: 2017-10-30

## 2017-10-30 ENCOUNTER — HOSPITAL ENCOUNTER (OUTPATIENT)
Age: 27
Discharge: HOME | End: 2017-10-30

## 2017-10-30 DIAGNOSIS — S82.142D CLOSED FRACTURE OF LEFT TIBIAL PLATEAU WITH ROUTINE HEALING, SUBSEQUENT ENCOUNTER: ICD-10-CM

## 2017-10-30 PROCEDURE — 73562 X-RAY EXAM OF KNEE 3: CPT

## 2017-10-31 ENCOUNTER — HOSPITAL ENCOUNTER (OUTPATIENT)
Dept: PHYSICAL THERAPY | Facility: CLINIC | Age: 27
Setting detail: THERAPIES SERIES
Discharge: HOME | End: 2017-10-31

## 2017-10-31 ENCOUNTER — OFFICE VISIT (OUTPATIENT)
Dept: ORTHOPEDIC SURGERY | Age: 27
End: 2017-10-31

## 2017-10-31 VITALS — HEIGHT: 66 IN | WEIGHT: 220 LBS | BODY MASS INDEX: 35.36 KG/M2

## 2017-10-31 DIAGNOSIS — S82.142D CLOSED FRACTURE OF LEFT TIBIAL PLATEAU WITH ROUTINE HEALING, SUBSEQUENT ENCOUNTER: Primary | ICD-10-CM

## 2017-10-31 PROCEDURE — 99024 POSTOP FOLLOW-UP VISIT: CPT | Performed by: ORTHOPAEDIC SURGERY

## 2017-10-31 NOTE — PROGRESS NOTES
This patient had undergone open reduction internal fixation of left lateral tibial plateau fracture on August 4 is doing very well. She is ambulating with one crutch. 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 Examination: The incision remains well healed and she has almost full range of motion. Without the crutch her gait is very is short based but with the crutch and improves dramatically. X-rays: 3 views show continued healing of the plateau fracture. Treatment: She will continue with the physical therapy and gradually wean off. She will go into using a cane. By the time she returns here she should be walking independently. I will see her in 4 weeks.

## 2017-11-02 ENCOUNTER — HOSPITAL ENCOUNTER (OUTPATIENT)
Dept: PHYSICAL THERAPY | Facility: CLINIC | Age: 27
Setting detail: THERAPIES SERIES
Discharge: HOME | End: 2017-11-02

## 2017-11-02 PROCEDURE — 97110 THERAPEUTIC EXERCISES: CPT

## 2017-11-02 NOTE — FLOWSHEET NOTE
10/10         Standing   Added 10/19   Calf stretch 3x30\"     Step ups 2x10 6\" step Fwd/lat  Progressed    Step downs 2x10 6\" step Progressed    Tandem balance 3x30\"  1x30\" RLE back airex Added airex for last 2 sets on 10/26   squats 20x  Staggered feet  Added 10/19   Mini lunges 15x ea  Added    Heel raises 30x  Progressed reps 10/26   Wall sits 2x30\"  Added 10/24   Heel taps 2x10 4\" Progressed    2-way hip 20x A CKC on LLE  Added    TOTAL GYM   Increased level 10/10   DL squats 30x L10 For motion  100 deg   SL squats 3x10 L9 Increased level 10/24   SL heel raises 3x10 L9 Progressed level 10/24   TKE 30x black Progressed 10/6   BERNARDO leg press 3x8 2 pl  2.5 pl Added   Staggered feet     Gait trainin' with straight cane      Specific Instructions for next treatment:     Treatment Charges: Mins Units   []  Modalities     [x]  Ther Exercise 35 2   []  Manual Therapy     []  Ther Activities     []  Aquatics     []  Vasocompression     []  Other Gait training     Total Treatment time 35 2       Assessment: [x] Progressing toward goals. Pt denies pain at the end of the session this date and only noted intermittent discomfort in the L knee/shin with progression of strengthening exercises. Pt was able to demonstrate heel to toe gait with the use of a straight cane and demonstrated good TKE and stability of the L knee. Pt was advised she can use her cane to ambulate around work and negotiate stairs as long as she feels comfortable. Pt was able to progress strengthening exercises with good tolerance an continued considerable L quad weakness. [] No change. [] Other:    Short Term Goals: MEET IN 6 VISITS Status   Pain: Pt will report less than or equal to 2/10 LLE pain with therapeutic interventions in order to improve LLE ROM and strength to prepare for change in WB status.  Ongoing 17 (3-4/10, dull ache)   ROM: Pt will improve L knee AROM to 0-75 degrees in order to improve L knee mobility to prepare for ambulation. MET 9/14/17  0-95 deg   Strength: Pt will be able to complete 10 quad sets without a towel roll under the L knee in order to improve quad activation to prepare for ambulation. MET 9/14/17  (towel roll under the ankle)   HEP: Pt will be independent in with HEP. MET 9/14/17   Long Term Goal: MEET IN 12+ VISITS     Pain: Pt will report less than or equal to 1/10 LLE pain with completion of ADLs while maintaining NWBing status. Ongoing   ROM: Pt will improve L knee AROM to 0-95 degrees in order to prepare for WBing on LLE  in order to climb stairs, ambulate, and progress to prior level of activity. Ongoing   Strength: Pt will demonstrate 4+/5 LLE strength in order to prepare for WBing on LLE in order to climb stairs, ambulate, and progress to prior level of activity. Ongoing   Outcome Measure: Pt will report greater than or equal to 20% on the LEFI in order to demonstrate improved function. Ongoing       Patient goals:  mobility and flexibility and independence back- can't get into the car an go somewhere         Pt. Education:  [x] Yes  [] No  [x] Reviewed Prior HEP/Ed  Method of Education: [x] Verbal  [] Demo  [] Written: for exercises to complete at home: OK[facility] with ankle weight  Comprehension of Education:  [x] Verbalizes understanding. [] Demonstrates understanding. [] Needs review. [x] Demonstrates/verbalizes HEP/Ed previously given. Plan: [x] Continue per plan of care.    [x] Other: additional visits ordered by surgeon 11/2/17      Time In: 9:39 a            Time Out: 10:22a    Electronically signed by:  Chintan Rodarte PT

## 2017-11-07 ENCOUNTER — HOSPITAL ENCOUNTER (OUTPATIENT)
Dept: PHYSICAL THERAPY | Facility: CLINIC | Age: 27
Setting detail: THERAPIES SERIES
Discharge: HOME | End: 2017-11-07

## 2017-11-07 PROCEDURE — 97110 THERAPEUTIC EXERCISES: CPT

## 2017-11-07 PROCEDURE — 97116 GAIT TRAINING THERAPY: CPT

## 2017-11-07 NOTE — FLOWSHEET NOTE
10/19   Mini lunges 20x ea  Added    Heel raises 30x  Progressed reps 10/26   Shahab Bucco sits 2x30\"  Added 10/24   Heel taps 1x10 4\" Progressed   Decreased eccentric control   4-way hip 20x Red  Progressed    TOTAL GYM   Increased level 10/10   DL squats 30x L10 For motion  100 deg   SL squats 3x10 L10 Increased level  (eccentric over 3 seconds)   SL heel raises 2x15 L10 Increased level    TKE 30x black Progressed 10/6   BERNARDO leg press 3x8 2 pl  2.5 pl Added   Staggered feet     Gait trainin' with straight cane      Specific Instructions for next treatment:     Treatment Charges: Mins Units   []  Modalities     [x]  Ther Exercise 29 1   []  Manual Therapy     []  Ther Activities     []  Aquatics     []  Vasocompression     [x]  Other Gait training 8 1   Total Treatment time 37 2       Assessment: [x] Progressing toward goals. Pt was able to progress gait around the clinic this date without the use of an assistive device with continued evidence of decreased WBing on the LLE. Pt was able to correct this gait deviation with increased verbal cueing to promote hip extension and also to ambulate with a quicker speed. Pt also demonstrated decreased eccentric control on the LLE this date with heel taps and these were held this date and eccentric control was addressed with the Total Gym. Pt denies pain this date but continues to demonstrate decreased LLE strength when completing strengthening exercises. Pt advised to continued competing HEP at the gym in order to progress strength. [] No change. [] Other:    Short Term Goals: MEET IN 6 VISITS Status   Pain: Pt will report less than or equal to 2/10 LLE pain with therapeutic interventions in order to improve LLE ROM and strength to prepare for change in WB status. Ongoing 17 (3-4/10, dull ache)   ROM: Pt will improve L knee AROM to 0-75 degrees in order to improve L knee mobility to prepare for ambulation.  MET 17  0-95 deg

## 2017-11-09 ENCOUNTER — HOSPITAL ENCOUNTER (OUTPATIENT)
Dept: PHYSICAL THERAPY | Facility: CLINIC | Age: 27
Setting detail: THERAPIES SERIES
Discharge: HOME | End: 2017-11-09

## 2017-11-09 PROCEDURE — 97110 THERAPEUTIC EXERCISES: CPT

## 2017-11-09 NOTE — FLOWSHEET NOTE
[] [facility]       Outpatient Physical        Therapy       [address]       Phone: [phone]       Fax: [phone] [x] [facility] at [address]       Phone: [phone]       Fax: [phone] [] Lizette. [address] [place] [address] Health Promotion  [address] [place] [address]   Phone: [phone]   Fax:  [phone]     Physical Therapy Daily Treatment Note    Date:  2017  Patient Name:  Raissa Arteaga    :  1990  MRN: [id number]  Physician: Dr. Jennifer Monaco: Ivania Grimaldo Diagnosis: Displaced bicondylar fracture of left tibia, subsequent encounter for closed fracture with routine healing                                   Rehab Codes: R60.0, M25.562, M25.662, R26.2NEC, M62.552, M62.81  Onset date: 17; 17 (surgery)                                          Next Dr's appt.: 17     Visit# / total visits:  (updated 17)  Cancels/No Shows: 1 ns    Subjective:   Pain:  [] Yes  [] No Location: L leg Pain Rating: (0-10 scale) (sore)/10 (tightness- when woke up)  Pain altered Tx:  [] No  [] Yes  Action:  Comments: Pt arrives to the clinic without pain this date. Pt notes since the temperature has dropped it bothers her leg but notes it has evened out with the consistent low temperatures. Pt notes her L knee has been popping but the discomfort has been going down in the last few days. Objective: Arrives with both shoes donned.    Modalities: deferred ice  Precautions: 100%  Exercises: bolded completed  Exercise Reps/ Time Weight/ Level/Color Comments   Upright bike 5' S1 Partial revolutions to work on motion   TM 5' 1.5 Added 10/19  Cues for heel strike and push-off, increase hip extension     gait 2 laps  Without AD  Increased gait speed  Increased WBing on LLE  Increased hip extension         Standing   Added 10/19   Calf stretch 3x30\"     Step ups with high knee 2x10 6\" step Fwd/lat  Progressed 11/2   Step downs 2x10 6\" step Progressed 11/2   SLS 4x20\" LLE  airex Progressed 11/7   squats 30x  Staggered feet  Added 10/19   Mini lunges- fwd/retro 20x ea  Added 11/2   Heel raises- up 2, down 1 3x10  Progressed 11/9   Terrye Georgetown sits 2x30\"  Added 10/24   Heel taps 1x10 4\" Progressed 11/2  Decreased eccentric control   4-way hip 20x Red  Progressed 11/7   TOTAL GYM   Increased level 10/10   DL squats 30x L10 For motion  100 deg   SL squats 3x10 L10 Increased level 11/7 (eccentric over 3 seconds)   SL heel raises 2x15 L10 Increased level 11/7   TKE 30x black Progressed 10/6   BERNARDO leg press 3x8 3 pl   Added 11/2  Staggered feet   SL leg press 2x10 1 pl Added 11/9   Prone hamstring curl- up 2 and down 1 2x10 1 pl Added 11/9           Specific Instructions for next treatment:     Treatment Charges: Mins Units   []  Modalities     [x]  Ther Exercise 37 2   []  Manual Therapy     []  Ther Activities     []  Aquatics     []  Vasocompression     []  Other Gait training     Total Treatment time 37 2       Assessment: [x] Progressing toward goals. Pt denies pain at the end of the session this date. Pt continues to demonstrate significant LLE weakness as demonstrated through inability to perform a SL heel raise on the LLE. Pt was able to progress LLE strength with good tolerance but continues to demonstrate decreased SLS on the LLE.      [] No change. [] Other:    Short Term Goals: MEET IN 6 VISITS Status   Pain: Pt will report less than or equal to 2/10 LLE pain with therapeutic interventions in order to improve LLE ROM and strength to prepare for change in WB status. Ongoing 9/14/17 (3-4/10, dull ache)   ROM: Pt will improve L knee AROM to 0-75 degrees in order to improve L knee mobility to prepare for ambulation.  MET 9/14/17  0-95 deg   Strength: Pt will be able to complete 10 quad sets without a towel roll under the L knee in order to improve quad activation to prepare for ambulation. MET 9/14/17  (towel roll under the ankle)   HEP: Pt will be independent in with HEP. MET 9/14/17   Long Term Goal: MEET IN 12+ VISITS     Pain: Pt will report less than or equal to 1/10 LLE pain with completion of ADLs while maintaining NWBing status. Ongoing   ROM: Pt will improve L knee AROM to 0-95 degrees in order to prepare for WBing on LLE  in order to climb stairs, ambulate, and progress to prior level of activity. Ongoing   Strength: Pt will demonstrate 4+/5 LLE strength in order to prepare for WBing on LLE in order to climb stairs, ambulate, and progress to prior level of activity. Ongoing   Outcome Measure: Pt will report greater than or equal to 20% on the LEFI in order to demonstrate improved function. Ongoing       Patient goals:  mobility and flexibility and independence back- can't get into the car an go somewhere         Pt. Education:  [x] Yes  [] No  [x] Reviewed Prior HEP/Ed  Method of Education: [x] Verbal  [] Demo  [] Written: for exercises to complete at home: [employer] with ankle weight  Comprehension of Education:  [x] Verbalizes understanding. [] Demonstrates understanding. [] Needs review. [x] Demonstrates/verbalizes HEP/Ed previously given. Plan: [x] Continue per plan of care.    [x] Other: additional visits ordered by surgeon 11/2/17      Time In: 9:16 a            Time Out: 10:05a    Electronically signed by:  Marylin Keller PT

## 2017-11-14 ENCOUNTER — HOSPITAL ENCOUNTER (OUTPATIENT)
Dept: PHYSICAL THERAPY | Facility: CLINIC | Age: 27
Setting detail: THERAPIES SERIES
Discharge: HOME | End: 2017-11-14

## 2017-11-14 PROCEDURE — 97110 THERAPEUTIC EXERCISES: CPT

## 2017-11-14 NOTE — FLOWSHEET NOTE
Added 11/2   Heel raises- up 2, down 1 3x10  Progressed 11/9   Shad Bye sits 2x30\"  Added 10/24   Heel taps 1x10 4\" Progressed 11/2  Decreased eccentric control   4-way hip 20x Red  Progressed 11/7- bilaterally    TOTAL GYM   Increased level 10/10   DL squats 30x L10 For motion  100 deg   SL squats 3x10 L10 Increased level 11/7 (eccentric over 3 seconds)   SL heel raises 2x15 L10 Increased level 11/7   TKE 30x black Progressed 10/6   BERNARDO leg press 3x8 3 pl   Added 11/2  Staggered feet   SL leg press 2x10 1 pl Added 11/9   Prone hamstring curl- up 2 and down 1 2x10 1 pl Added 11/9           Specific Instructions for next treatment:     Treatment Charges: Mins Units   []  Modalities     [x]  Ther Exercise 35 2   []  Manual Therapy     []  Ther Activities     []  Aquatics     []  Vasocompression     []  Other Gait training     Total Treatment time 35 2       Assessment: [x] Progressing toward goals. Good tolerance to all exercises. Pt denies pain during and after treatment. Declines need for ice. [] No change. [] Other:    Short Term Goals: MEET IN 6 VISITS Status   Pain: Pt will report less than or equal to 2/10 LLE pain with therapeutic interventions in order to improve LLE ROM and strength to prepare for change in WB status. Ongoing 9/14/17 (3-4/10, dull ache)   ROM: Pt will improve L knee AROM to 0-75 degrees in order to improve L knee mobility to prepare for ambulation. MET 9/14/17  0-95 deg   Strength: Pt will be able to complete 10 quad sets without a towel roll under the L knee in order to improve quad activation to prepare for ambulation. MET 9/14/17  (towel roll under the ankle)   HEP: Pt will be independent in with HEP. MET 9/14/17   Long Term Goal: MEET IN 12+ VISITS     Pain: Pt will report less than or equal to 1/10 LLE pain with completion of ADLs while maintaining NWBing status.  Ongoing   ROM: Pt will improve L knee AROM to 0-95 degrees in order to prepare for WBing on LLE  in order to climb

## 2017-11-16 ENCOUNTER — HOSPITAL ENCOUNTER (OUTPATIENT)
Dept: PHYSICAL THERAPY | Facility: CLINIC | Age: 27
Setting detail: THERAPIES SERIES
Discharge: HOME | End: 2017-11-16

## 2017-11-16 PROCEDURE — 97110 THERAPEUTIC EXERCISES: CPT
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
## 2017-11-21 ENCOUNTER — HOSPITAL ENCOUNTER (OUTPATIENT)
Dept: PHYSICAL THERAPY | Facility: CLINIC | Age: 27
Setting detail: THERAPIES SERIES
Discharge: HOME | End: 2017-11-21

## 2017-11-21 PROCEDURE — 97110 THERAPEUTIC EXERCISES: CPT

## 2017-11-21 NOTE — FLOWSHEET NOTE
SL Heel raises 3x10  Progressed 11/21  UE support   Heel taps 3x10 4\" Progressed 11/2  Decreased eccentric control   4-way hip 30x green Progressed 11/7- bilaterally    TKE 30x black Progressed 10/6   BERNARDO leg press 3x8 5 pl   Added 11/2  Staggered feet   SL leg press 3x8 2 pl Added 11/9   Knee extension 3x8 2.25 pl Added 11/21   Prone hamstring curl- up 2 and down 1 3x10 1.5 pl Added 11/9   Seated hip abd 30x 8 pl Added 11/16   Resisted retro walking 10x 1 pl Added 11/16   Stool scoots 1 lap 10#  Added 11/21   deadlifts 3x10 15# ea Added 11/16         Specific Instructions for next treatment: add lat amor    Treatment Charges: Mins Units   []  Modalities     [x]  Ther Exercise 35 2   []  Manual Therapy     []  Ther Activities     []  Aquatics     []  Vasocompression     []  Other Gait training     Total Treatment time 35 2       Assessment: [x] Progressing toward goals. Pt again denies pain at the end of the session and continues to demonstrate improving strength in the LLE. Pt continues to demonstrate decreased quad eccentric control compared to concentric control and decreased concentric/eccentric calf strength. Pt was however able to perform SL heel raises on the LLE with the use of BUE support. Pt also demonstrates improved gait mechanics on and off the treadmill with improved stance time on the LLE and improved heel to toe gait with simultaneous arm swing. [] No change.      [x] Other:  Assessment 11/21  L knee AROM  Flexion: 121°  Extension: +6°  BLE Strength  Hip flexion (SLR; ilipsoas): 5/5;5/5, B  continued decreased quad set on L  Hip abduction: 4+/5, B  Hip ext: 4+/5 on L; 5/5 on R  Knee extension: 4/5 on L  Knee flexion: 4-/5 on R; 4+/5 R  Able to complete SL heel raises with UE support    Short Term Goals: MEET IN 6 VISITS Status   Pain: Pt will report less than or equal to 2/10 LLE pain with therapeutic interventions in order to improve LLE ROM and strength to prepare for change in WB
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
## 2017-11-23 ENCOUNTER — APPOINTMENT (OUTPATIENT)
Dept: PHYSICAL THERAPY | Facility: CLINIC | Age: 27
End: 2017-11-23

## 2017-11-28 ENCOUNTER — OFFICE VISIT (OUTPATIENT)
Dept: ORTHOPEDIC SURGERY | Age: 27
End: 2017-11-28

## 2017-11-28 ENCOUNTER — HOSPITAL ENCOUNTER (OUTPATIENT)
Dept: PHYSICAL THERAPY | Facility: CLINIC | Age: 27
Setting detail: THERAPIES SERIES
Discharge: HOME | End: 2017-11-28

## 2017-11-28 VITALS — HEIGHT: 66 IN | WEIGHT: 220.02 LBS | BODY MASS INDEX: 35.36 KG/M2

## 2017-11-28 DIAGNOSIS — S82.142D CLOSED FRACTURE OF LEFT TIBIAL PLATEAU WITH ROUTINE HEALING, SUBSEQUENT ENCOUNTER: Primary | ICD-10-CM

## 2017-11-28 PROCEDURE — 99213 OFFICE O/P EST LOW 20 MIN: CPT | Performed by: ORTHOPAEDIC SURGERY

## 2017-11-28 NOTE — FLOWSHEET NOTE
[] Tennis White        Outpatient Physical                Therapy       [address]       Phone: [phone]       Fax: [phone] [x] [facility] at [address] [facility]       Phone: [phone]       Fax: [phone] [] Lizette. [address] [place] [address]      Phone: [phone]      Fax:  [phone]     Physical Therapy Cancel/No Show note    Date: 2017  Patient: Marya Domínguez  : 1990  MRN: [id number]    Cancels/No Shows to date: 2ns    For today's appointment patient:  []  Cancelled  []  Rescheduled appointment  [x]  No-show     Reason given by patient:  []  Patient ill  []  Conflicting appointment-  []  No transportation    []  Conflict with work  []  No reason given  []  Weather related  [x]  Other:  Arrived late and was not able to be accommodated this date.    Comments: confirmed next appt    Electronically signed by: Nano Pina PT

## 2017-11-29 RX ORDER — HYDROCODONE BITARTRATE AND ACETAMINOPHEN 5; 325 MG/1; MG/1
1 TABLET ORAL 2 TIMES DAILY
Qty: 30 TABLET | Refills: 0 | Status: SHIPPED | OUTPATIENT
Start: 2017-11-29

## 2017-11-30 ENCOUNTER — HOSPITAL ENCOUNTER (OUTPATIENT)
Dept: PHYSICAL THERAPY | Facility: CLINIC | Age: 27
Setting detail: THERAPIES SERIES
Discharge: HOME | End: 2017-11-30

## 2017-11-30 PROCEDURE — 97110 THERAPEUTIC EXERCISES: CPT

## 2017-11-30 PROCEDURE — 97140 MANUAL THERAPY 1/> REGIONS: CPT

## 2017-11-30 NOTE — FLOWSHEET NOTE
[] [facility]       Outpatient Physical        Therapy       [address]       Phone: [phone]       Fax: [phone] [x] [facility] at [address]       Phone: [phone]       Fax: [phone] [] Lizette. [facility]   Phone: [phone]   Fax:  [phone]     Physical Therapy Daily Treatment Note    Date:  2017  Patient Name:  Amador Calderon    :  1990  MRN: [id number]  Physician: Dr. Kayli Castellano: Jud Epps Diagnosis: Displaced bicondylar fracture of left tibia, subsequent encounter for closed fracture with routine healing                                   Rehab Codes: R60.0, M25.562, M25.662, R26.2NEC, M62.552, M62.81  Onset date: 17; 17 (surgery)                                          Next Dr's appt.: 17     Visit# / total visits:  (updated 17)  Cancels/No Shows: 1 ns    Subjective:   Pain:  [] Yes  [] No Location: L leg Pain Rating: (0-10 scale) 0/10  Pain altered Tx:  [] No  [] Yes  Action:    Comments: Pt reports some stiffness in the L leg today secondary to the weather. Pt reports by the end of the day she is walking pretty well. Objective: Arrives with both shoes donned.    Modalities: deferred ice  Precautions: 100%  Exercises: bolded completed  Exercise Reps/ Time Weight/ Level/Color Comments   TM 5' 1.5 Added 10/19  Cues for heel strike and push-off, increase hip extension     Octane 8'  Added          Standing   Added 10/19   Calf stretch 3x30\"     Step ups with high knee 30x 6\" step Fwd/lat  Progressed    Step downs 20x 6\" step Progressed    SLS 4x20\" LLE  airex Progressed    squats 30x  2\" step under R foot  Added 10/19   Mini lunges- fwd/lat/retro 10x ea  Added   Fwd-> retro   SL Heel raises 3x10  Progressed   UE

## 2017-12-03 ENCOUNTER — HOSPITAL ENCOUNTER (OUTPATIENT)
Dept: GENERAL RADIOLOGY | Age: 27
Discharge: HOME | End: 2017-12-03

## 2017-12-03 ENCOUNTER — HOSPITAL ENCOUNTER (OUTPATIENT)
Age: 27
Discharge: HOME | End: 2017-12-03

## 2017-12-03 DIAGNOSIS — S82.142D CLOSED FRACTURE OF LEFT TIBIAL PLATEAU WITH ROUTINE HEALING, SUBSEQUENT ENCOUNTER: ICD-10-CM

## 2017-12-03 PROCEDURE — 73565 X-RAY EXAM OF KNEES: CPT

## 2017-12-07 ENCOUNTER — HOSPITAL ENCOUNTER (OUTPATIENT)
Dept: PHYSICAL THERAPY | Facility: CLINIC | Age: 27
Setting detail: THERAPIES SERIES
Discharge: HOME | End: 2017-12-07

## 2017-12-07 PROCEDURE — 97110 THERAPEUTIC EXERCISES: CPT

## 2017-12-07 NOTE — FLOWSHEET NOTE
11/2  Decreased eccentric control   4-way hip 30x blue Progressed 12/7 bilaterally    BERNARDO leg press 3x8 5 pl   Added 11/2  Staggered feet   SL leg press up 2, down 1 3x10 2.5 pl Added 11/9   Knee extension 3x8 2.25 pl Added 11/21   Prone hamstring curl- up 2 and down 1 3x10 1.5 pl Added 11/9   Seated hip abd/add 30x 8 pl Added 11/16   Resisted/Fwd retro walking 10x 1 pl Added 11/16   Stool scoots 1 lap 10#  Added 11/21   Side steps 2x30' blue Band above knees  Added 12/7   Monster walks 2x30' blue Band above knees  Added 12/7   SL sit to stand 20x  Elevated mat table  Added 12/7   deadlifts 3x10 15# ea Added 11/16         Specific Instructions for next treatment:     Treatment Charges: Mins Units   []  Modalities     [x]  Ther Exercise 35 2   []  Manual Therapy     []  Ther Activities     []  Aquatics     []  Vasocompression     []  Other Gait training     Total Treatment time 35 2       Assessment: [x] Progressing toward goals. Pt continues to note ache in the L leg this date and notes muscle fatigue at the end of the session. Pt was able to progress strengthening exercises with good tolerance this date with continued notable LLE 10-15 compared to the R.      [] No change. [] Other:  Assessment 11/21  L knee AROM  Flexion: 121°  Extension: +6°  BLE Strength  Hip flexion (SLR; ilipsoas): 5/5;5/5, B  continued decreased quad set on L  Hip abduction: 4+/5, B  Hip ext: 4+/5 on L; 5/5 on R  Knee extension: 4/5 on L  Knee flexion: 4-/5 on R; 4+/5 R  Able to complete SL heel raises with UE support    Short Term Goals: MEET IN 6 VISITS Status   Pain: Pt will report less than or equal to 2/10 LLE pain with therapeutic interventions in order to improve LLE ROM and strength to prepare for change in WB status. MET 11/21/17   ROM: Pt will improve L knee AROM to 0-75 degrees in order to improve L knee mobility to prepare for ambulation.  MET 9/14/17  0-95 deg   Strength: Pt will be able to complete 10 quad sets without a towel roll under the L knee in order to improve quad activation to prepare for ambulation. MET 9/14/17  (towel roll under the ankle)   HEP: Pt will be independent in with HEP. MET 9/14/17   Long Term Goal: MEET IN 12+ VISITS     Pain: Pt will report less than or equal to 1/10 LLE pain with completion of ADLs while maintaining NWBing status. MET 11/21/17   ROM: Pt will improve L knee AROM to 0-95 degrees in order to prepare for WBing on LLE  in order to climb stairs, ambulate, and progress to prior level of activity. MET 11/21/17   Strength: Pt will demonstrate 4+/5 LLE strength in order to prepare for WBing on LLE in order to climb stairs, ambulate, and progress to prior level of activity. Ongoing 11/21/17   Outcome Measure: Pt will report greater than or equal to 20% on the LEFI in order to demonstrate improved function. Ongoing       Patient goals:  mobility and flexibility and independence back- can't get into the car an go somewhere         Pt. Education:  [] Yes  [x] No  [] Reviewed Prior HEP/Ed  Method of Education: [] Verbal  [] Demo  [] Written:  Comprehension of Education:  [x] Verbalizes understanding. [] Demonstrates understanding. [] Needs review. [] Demonstrates/verbalizes HEP/Ed previously given. Plan: [x] Continue per plan of care.    [x] Other: frequency: 1x/week      Time In: 9:15 am              Time Out: 10:00 am    Electronically signed by:  Sharmin Metz, PT

## 2017-12-14 ENCOUNTER — HOSPITAL ENCOUNTER (OUTPATIENT)
Dept: PHYSICAL THERAPY | Facility: CLINIC | Age: 27
Setting detail: THERAPIES SERIES
Discharge: HOME | End: 2017-12-14

## 2017-12-14 PROCEDURE — 97110 THERAPEUTIC EXERCISES: CPT

## 2017-12-14 NOTE — FLOWSHEET NOTE
L  Knee flexion: 4-/5 on R; 4+/5 R  Able to complete SL heel raises with UE support    Short Term Goals: MEET IN 6 VISITS Status   Pain: Pt will report less than or equal to 2/10 LLE pain with therapeutic interventions in order to improve LLE ROM and strength to prepare for change in WB status. MET 11/21/17   ROM: Pt will improve L knee AROM to 0-75 degrees in order to improve L knee mobility to prepare for ambulation. MET 9/14/17  0-95 deg   Strength: Pt will be able to complete 10 quad sets without a towel roll under the L knee in order to improve quad activation to prepare for ambulation. MET 9/14/17  (towel roll under the ankle)   HEP: Pt will be independent in with HEP. MET 9/14/17   Long Term Goal: MEET IN 12+ VISITS     Pain: Pt will report less than or equal to 1/10 LLE pain with completion of ADLs while maintaining NWBing status. MET 11/21/17   ROM: Pt will improve L knee AROM to 0-95 degrees in order to prepare for WBing on LLE  in order to climb stairs, ambulate, and progress to prior level of activity. MET 11/21/17   Strength: Pt will demonstrate 4+/5 LLE strength in order to prepare for WBing on LLE in order to climb stairs, ambulate, and progress to prior level of activity. Ongoing 11/21/17   Outcome Measure: Pt will report greater than or equal to 20% on the LEFI in order to demonstrate improved function. Ongoing       Patient goals:  mobility and flexibility and independence back- can't get into the car an go somewhere         Pt. Education:  [] Yes  [x] No  [] Reviewed Prior HEP/Ed  Method of Education: [] Verbal  [] Demo  [] Written:  Comprehension of Education:  [x] Verbalizes understanding. [] Demonstrates understanding. [] Needs review. [x] Demonstrates/verbalizes HEP/Ed previously given. Plan: [x] Continue per plan of care- 1 visit remains.    [x] Other: frequency: 1x/week      Time In: 9:15 am              Time Out: 10:08 am    Electronically signed by:  Jess Davenport, PT

## 2017-12-21 ENCOUNTER — HOSPITAL ENCOUNTER (OUTPATIENT)
Dept: PHYSICAL THERAPY | Facility: CLINIC | Age: 27
Setting detail: THERAPIES SERIES
Discharge: HOME | End: 2017-12-21

## 2017-12-21 PROCEDURE — 97110 THERAPEUTIC EXERCISES: CPT

## 2017-12-21 NOTE — FLOWSHEET NOTE
[] Shoshana Lee       Outpatient Physical        Therapy       [address]       Phone: [phone]       Fax: [phone] [x] [facility] Promotion at [address]       Phone: [phone]       Fax: [phone] [] Lizette. [address] [facility] Promotion  [address] [place] [address]   Phone: [phone]   Fax:  [phone]     Physical Therapy Daily Treatment Note    Date:  2017  Patient Name:  Prisca Ramirez    :  1990  MRN: [id number]  Physician: Dr. Tomi Arriola: Mary Baker Diagnosis: Displaced bicondylar fracture of left tibia, subsequent encounter for closed fracture with routine healing                                   Rehab Codes: R60.0, M25.562, M25.662, R26.2NEC, M62.552, M62.81  Onset date: 17; 17 (surgery)                                          Next Dr's appt.: 17     Visit# / total visits:  (updated 17)  Cancels/No Shows: 1 ns    Subjective:   Pain:  [] Yes  [] No Location: L leg Pain Rating: (0-10 scale) achey/10  Pain altered Tx:  [] No  [] Yes  Action:    Comments: Pt continues to deny pain in the L leg but notes intermittent dull ache over the past few weeks. Pt reports she has not gone to the gym yet this week due to the holidays.      Objective:   Modalities: deferred ice  Precautions: 100%  Exercises: bolded completed  Exercise Reps/ Time Weight/ Level/Color Comments   Octane 10'  Added          Standing   Added 10/19   Calf stretch 3x30\"     Step ups with high knee 30x 14\" step Fwd  Progressed    Step downs 30x 6\"    Heel taps 2x15 4\"    Tilt board 3x30\"  1x30\" A/P  M/L Added   Easier to perform M/L   squats 30x     Walking lunges 1x30' ea    Progressed   Improved tolerance to retro   SL Heel raises 4x10  Progressed   UE support   Stool scoots 1 lap 10#  Added    Side steps AROM to 0-75 degrees in order to improve L knee mobility to prepare for ambulation. MET 9/14/17  0-95 deg   Strength: Pt will be able to complete 10 quad sets without a towel roll under the L knee in order to improve quad activation to prepare for ambulation. MET 9/14/17  (towel roll under the ankle)   HEP: Pt will be independent in with HEP. MET 9/14/17   Long Term Goal: MEET IN 12+ VISITS     Pain: Pt will report less than or equal to 1/10 LLE pain with completion of ADLs while maintaining NWBing status. MET 11/21/17   ROM: Pt will improve L knee AROM to 0-95 degrees in order to prepare for WBing on LLE  in order to climb stairs, ambulate, and progress to prior level of activity. MET 11/21/17   Strength: Pt will demonstrate 4+/5 LLE strength in order to prepare for WBing on LLE in order to climb stairs, ambulate, and progress to prior level of activity. MET 12/21/17   Outcome Measure: Pt will report greater than or equal to 20% on the LEFI in order to demonstrate improved function. MET 12/21/17       Patient goals:  mobility and flexibility and independence back- can't get into the car an go somewhere         Pt. Education:  [x] Yes  [] No  [x] Reviewed Prior HEP/Ed  Method of Education: [x] Verbal  [] Demo  [] Written:  Comprehension of Education:  [x] Verbalizes understanding. [] Demonstrates understanding. [] Needs review. [x] Demonstrates/verbalizes HEP/Ed previously given.      Plan: [] Continue per plan of care   [x] Other: discharge 12/21/17      Time In: 9:15 am              Time Out: 9:56 am    Electronically signed by:  Romeo Wilder, PT

## 2017-12-21 NOTE — DISCHARGE SUMMARY
+6°  BLE Strength  Hip flexion (SLR; ilipsoas): 5/5;5/5, B  continued decreased quad set on L  Hip abduction: 4+/5, B  Hip ext: 4+/5 on L; 5/5 on R  Knee extension: 4/5 on L  Knee flexion: 4-/5 on R; 4+/5 R  Able to complete SL heel raises with UE support       Assessment: Assessment:  [x] Progressing toward goals. [x] Other: Pt was able to achieve all STG and LTGs this date with continued strength deficits noted in the LLE. Pt has been able to achieve L knee AROM to 6-0-125° and is demonstrating improved gait mechanics with mild gait deficits due to LLE weakness. Pt will be discharged this date with instructions to continue completing her HEP in order to progress LLE strength and return to prior level of activity. Short Term Goals: MEET IN 6 VISITS Status   Pain: Pt will report less than or equal to 2/10 LLE pain with therapeutic interventions in order to improve LLE ROM and strength to prepare for change in WB status. MET 11/21/17   ROM: Pt will improve L knee AROM to 0-75 degrees in order to improve L knee mobility to prepare for ambulation. MET 9/14/17  0-95 deg   Strength: Pt will be able to complete 10 quad sets without a towel roll under the L knee in order to improve quad activation to prepare for ambulation. MET 9/14/17  (towel roll under the ankle)   HEP: Pt will be independent in with HEP. MET 9/14/17   Long Term Goal: MEET IN 12+ VISITS     Pain: Pt will report less than or equal to 1/10 LLE pain with completion of ADLs while maintaining NWBing status. MET 11/21/17   ROM: Pt will improve L knee AROM to 0-95 degrees in order to prepare for WBing on LLE  in order to climb stairs, ambulate, and progress to prior level of activity. MET 11/21/17   Strength: Pt will demonstrate 4+/5 LLE strength in order to prepare for WBing on LLE in order to climb stairs, ambulate, and progress to prior level of activity.   MET 12/21/17   Outcome Measure: Pt will report greater than or equal to 20% on the LEFI in order to demonstrate improved function. MET 12/21/17       Patient goals:  mobility and flexibility and independence back- can't get into the car an go somewhere       Treatment to Date:  [x] Therapeutic Exercise    [] Modalities:  [] Therapeutic Activity    [] Ultrasound  [] Electrical Stimulation  [] Gait Training     [] Massage       [] Lumbar/Cervical Traction  [] Neuromuscular Re-education [] Cold/hotpack [] Iontophoresis: 4 mg/mL  [x] Instruction in Home Exercise Program                     Dexamethasone Sodium  [] Manual Therapy             Phosphate 40-80 mAmin  [] Aquatic Therapy                   [] Vasocompression/    [] Other:             Game Ready    Discharge Status:     [x] Pt recovered from conditions. Treatment goals were met. [x] Pt to continue exercise/home instructions independently. [x] Pt has completed prescribed number of treatment sessions. Electronically signed by Romeo Wilder PT on 12/21/2017 at 6:56 PM      If you have any questions or concerns, please don't hesitate to call.   Thank you for your referral.

## 2018-01-09 ENCOUNTER — OFFICE VISIT (OUTPATIENT)
Dept: ORTHOPEDIC SURGERY | Age: 28
End: 2018-01-09

## 2018-01-09 VITALS — BODY MASS INDEX: 35.36 KG/M2 | HEIGHT: 66 IN | WEIGHT: 220.02 LBS

## 2018-01-09 DIAGNOSIS — S82.142D CLOSED FRACTURE OF LEFT TIBIAL PLATEAU WITH ROUTINE HEALING, SUBSEQUENT ENCOUNTER: Primary | ICD-10-CM

## 2018-01-09 PROCEDURE — 99213 OFFICE O/P EST LOW 20 MIN: CPT | Performed by: ORTHOPAEDIC SURGERY

## (undated) DEVICE — C-ARMOR C-ARM EQUIPMENT COVERS CLEAR STERILE UNIVERSAL FIT 12 PER CASE: Brand: C-ARMOR

## (undated) DEVICE — Device

## (undated) DEVICE — GLOVE ORANGE PI 8   MSG9080

## (undated) DEVICE — SUTURE VIC + ANTIBACT NDL MO-4 1-0 27 VCP437H

## (undated) DEVICE — GLOVE ORANGE PI 7   MSG9070

## (undated) DEVICE — BLADE SAW W7XL18.5MM THK0.51MM SAG OSC TPS 229633114] STRYKER INSTRUMENT DIV]

## (undated) DEVICE — BIT DRL L250MM DIA2.5MM CALIB L95MM QUIK CPL W/ STP FOR

## (undated) DEVICE — CHLORAPREP 26ML ORANGE

## (undated) DEVICE — SUTURE VCRL + SZ 0 L27IN ABSRB WHT CT-2 1/2 CIR TAPERPOINT VCP270H

## (undated) DEVICE — GUIDEWIRE ORTH L150MM DIA1.25MM S STL NTHRD FOR 4MM CANN

## (undated) DEVICE — 3M™ COBAN™ NL STERILE NON-LATEX SELF-ADHERENT WRAP, 2084S, 4 IN X 5 YD (10 CM X 4,5 M), 18 ROLLS/CASE: Brand: 3M™ COBAN™

## (undated) DEVICE — GOWN,AURORA,NONRNF,XL,30/CS: Brand: MEDLINE

## (undated) DEVICE — STATION ISOLATN W1775XH205IN D675IN ICU WALL OR GLS MT P2

## (undated) DEVICE — GLOVE ORANGE PI 8 1/2   MSG9085

## (undated) DEVICE — STERILE HOOK LOCK LATEX FREE ELASTIC BANDAGE 6INX5YD: Brand: HOOK LOCK™

## (undated) DEVICE — GOWN,AURORA,NONREINFORCED,LARGE: Brand: MEDLINE

## (undated) DEVICE — GLOVE ORANGE PI 7 1/2   MSG9075

## (undated) DEVICE — ZIMMER® STERILE DISPOSABLE TOURNIQUET CUFF WITH PROTECTIVE SLEEVE AND PLC, DUAL PORT, SINGLE BLADDER, 34 IN. (86 CM)

## (undated) DEVICE — PADDING CAST W6INXL4YD ST COT COHESIVE HND TEARABLE SPEC

## (undated) DEVICE — SUTURE VIC + ABS BR UD X1 2-0 27IN VCP459H

## (undated) DEVICE — BIT DRL L250MM DIA2.8MM CALIB L95MM QUIK CPL W/ STP FOR

## (undated) DEVICE — DRAPE C ARM UNIV W41XL74IN CLR PLAS XR VELC CLSR POLY STRP

## (undated) DEVICE — ORTHO EXT PK

## (undated) DEVICE — MICRO DUAL CUT (9.0 X 0.38 X 25.0MM)

## (undated) DEVICE — BIT DRL L160MM DIA2.7MM CANN QUIK CPL ADJ STP REUSE FOR

## (undated) DEVICE — 2.4MM X 152MM INTERMEDIATE GUIDE                                    PIN STERILE: Brand: CHS

## (undated) DEVICE — BIT DRL L110MM DIA2.5MM G QUIK CPL W/O STP REUSE